# Patient Record
Sex: MALE | Race: WHITE | NOT HISPANIC OR LATINO | Employment: OTHER | URBAN - NONMETROPOLITAN AREA
[De-identification: names, ages, dates, MRNs, and addresses within clinical notes are randomized per-mention and may not be internally consistent; named-entity substitution may affect disease eponyms.]

---

## 2017-02-05 ENCOUNTER — APPOINTMENT (OUTPATIENT)
Dept: GENERAL RADIOLOGY | Facility: HOSPITAL | Age: 27
End: 2017-02-05

## 2017-02-05 ENCOUNTER — HOSPITAL ENCOUNTER (OUTPATIENT)
Facility: HOSPITAL | Age: 27
Setting detail: OBSERVATION
Discharge: HOME | End: 2017-02-06
Attending: EMERGENCY MEDICINE | Admitting: PSYCHIATRY & NEUROLOGY

## 2017-02-05 ENCOUNTER — APPOINTMENT (OUTPATIENT)
Dept: CT IMAGING | Facility: HOSPITAL | Age: 27
End: 2017-02-05

## 2017-02-05 DIAGNOSIS — G25.3 MYOCLONUS: ICD-10-CM

## 2017-02-05 DIAGNOSIS — Z98.2 VP (VENTRICULOPERITONEAL) SHUNT STATUS: Primary | ICD-10-CM

## 2017-02-05 LAB
ALBUMIN SERPL-MCNC: 4.2 G/DL (ref 3.5–5)
ALBUMIN/GLOB SERPL: 1.5 G/DL (ref 1.1–2.5)
ALP SERPL-CCNC: 41 U/L (ref 24–120)
ALT SERPL W P-5'-P-CCNC: 41 U/L (ref 0–54)
ANION GAP SERPL CALCULATED.3IONS-SCNC: 10 MMOL/L (ref 4–13)
ANION GAP SERPL CALCULATED.3IONS-SCNC: 9 MMOL/L (ref 4–13)
AST SERPL-CCNC: 21 U/L (ref 7–45)
BASOPHILS # BLD AUTO: 0.02 10*3/MM3 (ref 0–0.2)
BASOPHILS # BLD AUTO: 0.02 10*3/MM3 (ref 0–0.2)
BASOPHILS NFR BLD AUTO: 0.2 % (ref 0–2)
BASOPHILS NFR BLD AUTO: 0.3 % (ref 0–2)
BILIRUB SERPL-MCNC: 0.5 MG/DL (ref 0.1–1)
BUN BLD-MCNC: 13 MG/DL (ref 5–21)
BUN BLD-MCNC: 14 MG/DL (ref 5–21)
BUN/CREAT SERPL: 19.4 (ref 7–25)
BUN/CREAT SERPL: 20.3 (ref 7–25)
CALCIUM SPEC-SCNC: 9 MG/DL (ref 8.4–10.4)
CALCIUM SPEC-SCNC: 9 MG/DL (ref 8.4–10.4)
CHLORIDE SERPL-SCNC: 104 MMOL/L (ref 98–110)
CHLORIDE SERPL-SCNC: 106 MMOL/L (ref 98–110)
CO2 SERPL-SCNC: 25 MMOL/L (ref 24–31)
CO2 SERPL-SCNC: 27 MMOL/L (ref 24–31)
CREAT BLD-MCNC: 0.67 MG/DL (ref 0.5–1.4)
CREAT BLD-MCNC: 0.69 MG/DL (ref 0.5–1.4)
DEPRECATED RDW RBC AUTO: 41.4 FL (ref 40–54)
DEPRECATED RDW RBC AUTO: 41.5 FL (ref 40–54)
EOSINOPHIL # BLD AUTO: 0.08 10*3/MM3 (ref 0–0.7)
EOSINOPHIL # BLD AUTO: 0.08 10*3/MM3 (ref 0–0.7)
EOSINOPHIL NFR BLD AUTO: 1 % (ref 0–4)
EOSINOPHIL NFR BLD AUTO: 1.1 % (ref 0–4)
ERYTHROCYTE [DISTWIDTH] IN BLOOD BY AUTOMATED COUNT: 13 % (ref 12–15)
ERYTHROCYTE [DISTWIDTH] IN BLOOD BY AUTOMATED COUNT: 13 % (ref 12–15)
GFR SERPL CREATININE-BSD FRML MDRD: 141 ML/MIN/1.73
GFR SERPL CREATININE-BSD FRML MDRD: 146 ML/MIN/1.73
GLOBULIN UR ELPH-MCNC: 2.8 GM/DL
GLUCOSE BLD-MCNC: 93 MG/DL (ref 70–100)
GLUCOSE BLD-MCNC: 95 MG/DL (ref 70–100)
HCT VFR BLD AUTO: 40.3 % (ref 40–52)
HCT VFR BLD AUTO: 40.9 % (ref 40–52)
HGB BLD-MCNC: 14.1 G/DL (ref 14–18)
HGB BLD-MCNC: 14.3 G/DL (ref 14–18)
IMM GRANULOCYTES # BLD: 0.02 10*3/MM3 (ref 0–0.03)
IMM GRANULOCYTES # BLD: 0.02 10*3/MM3 (ref 0–0.03)
IMM GRANULOCYTES NFR BLD: 0.2 % (ref 0–5)
IMM GRANULOCYTES NFR BLD: 0.3 % (ref 0–5)
LYMPHOCYTES # BLD AUTO: 1.78 10*3/MM3 (ref 0.72–4.86)
LYMPHOCYTES # BLD AUTO: 2.07 10*3/MM3 (ref 0.72–4.86)
LYMPHOCYTES NFR BLD AUTO: 23.7 % (ref 15–45)
LYMPHOCYTES NFR BLD AUTO: 25 % (ref 15–45)
MCH RBC QN AUTO: 30.4 PG (ref 28–32)
MCH RBC QN AUTO: 30.5 PG (ref 28–32)
MCHC RBC AUTO-ENTMCNC: 35 G/DL (ref 33–36)
MCHC RBC AUTO-ENTMCNC: 35 G/DL (ref 33–36)
MCV RBC AUTO: 86.8 FL (ref 82–95)
MCV RBC AUTO: 87 FL (ref 82–95)
MONOCYTES # BLD AUTO: 0.56 10*3/MM3 (ref 0.19–1.3)
MONOCYTES # BLD AUTO: 0.65 10*3/MM3 (ref 0.19–1.3)
MONOCYTES NFR BLD AUTO: 7.5 % (ref 4–12)
MONOCYTES NFR BLD AUTO: 7.9 % (ref 4–12)
NEUTROPHILS # BLD AUTO: 5.05 10*3/MM3 (ref 1.87–8.4)
NEUTROPHILS # BLD AUTO: 5.43 10*3/MM3 (ref 1.87–8.4)
NEUTROPHILS NFR BLD AUTO: 65.7 % (ref 39–78)
NEUTROPHILS NFR BLD AUTO: 67.1 % (ref 39–78)
PLATELET # BLD AUTO: 232 10*3/MM3 (ref 130–400)
PLATELET # BLD AUTO: 239 10*3/MM3 (ref 130–400)
PMV BLD AUTO: 9.5 FL (ref 6–12)
PMV BLD AUTO: 9.7 FL (ref 6–12)
POTASSIUM BLD-SCNC: 3.9 MMOL/L (ref 3.5–5.3)
POTASSIUM BLD-SCNC: 4.3 MMOL/L (ref 3.5–5.3)
PROT SERPL-MCNC: 7 G/DL (ref 6.3–8.7)
RBC # BLD AUTO: 4.63 10*6/MM3 (ref 4.8–5.9)
RBC # BLD AUTO: 4.71 10*6/MM3 (ref 4.8–5.9)
SODIUM BLD-SCNC: 140 MMOL/L (ref 135–145)
SODIUM BLD-SCNC: 141 MMOL/L (ref 135–145)
WBC NRBC COR # BLD: 7.51 10*3/MM3 (ref 4.8–10.8)
WBC NRBC COR # BLD: 8.27 10*3/MM3 (ref 4.8–10.8)

## 2017-02-05 PROCEDURE — 96374 THER/PROPH/DIAG INJ IV PUSH: CPT

## 2017-02-05 PROCEDURE — 85025 COMPLETE CBC W/AUTO DIFF WBC: CPT | Performed by: EMERGENCY MEDICINE

## 2017-02-05 PROCEDURE — 80053 COMPREHEN METABOLIC PANEL: CPT | Performed by: EMERGENCY MEDICINE

## 2017-02-05 PROCEDURE — 70360 X-RAY EXAM OF NECK: CPT

## 2017-02-05 PROCEDURE — 93010 ELECTROCARDIOGRAM REPORT: CPT | Performed by: INTERNAL MEDICINE

## 2017-02-05 PROCEDURE — 71010 HC CHEST PA OR AP: CPT

## 2017-02-05 PROCEDURE — G0378 HOSPITAL OBSERVATION PER HR: HCPCS

## 2017-02-05 PROCEDURE — 94760 N-INVAS EAR/PLS OXIMETRY 1: CPT

## 2017-02-05 PROCEDURE — 94640 AIRWAY INHALATION TREATMENT: CPT

## 2017-02-05 PROCEDURE — 96372 THER/PROPH/DIAG INJ SC/IM: CPT

## 2017-02-05 PROCEDURE — 99284 EMERGENCY DEPT VISIT MOD MDM: CPT

## 2017-02-05 PROCEDURE — 99220 PR INITIAL OBSERVATION CARE/DAY 70 MINUTES: CPT | Performed by: PSYCHIATRY & NEUROLOGY

## 2017-02-05 PROCEDURE — 85025 COMPLETE CBC W/AUTO DIFF WBC: CPT | Performed by: PSYCHIATRY & NEUROLOGY

## 2017-02-05 PROCEDURE — 94799 UNLISTED PULMONARY SVC/PX: CPT

## 2017-02-05 PROCEDURE — 25010000002 ENOXAPARIN PER 10 MG: Performed by: PSYCHIATRY & NEUROLOGY

## 2017-02-05 PROCEDURE — 74000 HC ABDOMEN KUB: CPT

## 2017-02-05 PROCEDURE — 70450 CT HEAD/BRAIN W/O DYE: CPT

## 2017-02-05 PROCEDURE — 93005 ELECTROCARDIOGRAM TRACING: CPT | Performed by: PSYCHIATRY & NEUROLOGY

## 2017-02-05 RX ORDER — ACETAMINOPHEN 325 MG/1
650 TABLET ORAL EVERY 4 HOURS PRN
Status: DISCONTINUED | OUTPATIENT
Start: 2017-02-05 | End: 2017-02-06 | Stop reason: HOSPADM

## 2017-02-05 RX ORDER — PANTOPRAZOLE SODIUM 40 MG/10ML
40 INJECTION, POWDER, LYOPHILIZED, FOR SOLUTION INTRAVENOUS
Status: DISCONTINUED | OUTPATIENT
Start: 2017-02-05 | End: 2017-02-05 | Stop reason: CLARIF

## 2017-02-05 RX ORDER — SODIUM CHLORIDE 0.9 % (FLUSH) 0.9 %
1-10 SYRINGE (ML) INJECTION AS NEEDED
Status: DISCONTINUED | OUTPATIENT
Start: 2017-02-05 | End: 2017-02-06 | Stop reason: HOSPADM

## 2017-02-05 RX ORDER — ZOLPIDEM TARTRATE 5 MG/1
5 TABLET ORAL NIGHTLY PRN
Status: DISCONTINUED | OUTPATIENT
Start: 2017-02-05 | End: 2017-02-06 | Stop reason: HOSPADM

## 2017-02-05 RX ORDER — FAMOTIDINE 20 MG/1
20 TABLET, FILM COATED ORAL DAILY
Status: DISCONTINUED | OUTPATIENT
Start: 2017-02-05 | End: 2017-02-06 | Stop reason: HOSPADM

## 2017-02-05 RX ORDER — ALBUTEROL SULFATE 2.5 MG/3ML
2.5 SOLUTION RESPIRATORY (INHALATION)
Status: DISCONTINUED | OUTPATIENT
Start: 2017-02-05 | End: 2017-02-06 | Stop reason: HOSPADM

## 2017-02-05 RX ORDER — PANTOPRAZOLE SODIUM 40 MG/1
40 TABLET, DELAYED RELEASE ORAL
Status: DISCONTINUED | OUTPATIENT
Start: 2017-02-06 | End: 2017-02-06 | Stop reason: HOSPADM

## 2017-02-05 RX ORDER — RANITIDINE HCL 75 MG
75 TABLET ORAL AS NEEDED
COMMUNITY

## 2017-02-05 RX ADMIN — PANTOPRAZOLE SODIUM 40 MG: 40 INJECTION, POWDER, FOR SOLUTION INTRAVENOUS at 08:54

## 2017-02-05 RX ADMIN — ENOXAPARIN SODIUM 40 MG: 40 INJECTION SUBCUTANEOUS at 15:46

## 2017-02-05 RX ADMIN — ALBUTEROL SULFATE 2.5 MG: 2.5 SOLUTION RESPIRATORY (INHALATION) at 21:26

## 2017-02-05 RX ADMIN — FAMOTIDINE 20 MG: 20 TABLET, FILM COATED ORAL at 15:46

## 2017-02-05 NOTE — ED PROVIDER NOTES
Subjective   HPI Comments: She was seen at [facility]  x-rays were done and it would not read by the radiologist and the patient was transferred over here  History intracranial shunt and had a head-bobbing when the last time he has as head bobbing and his shunt was malfunctioning    Patient is a 26 y.o. male presenting with seizures.   Seizures   Seizure activity on arrival: no    Seizure type:  Focal  Preceding symptoms: no sensation of an aura present, no dizziness, no euphoria, no headache, no hyperventilation, no nausea, no numbness, no panic and no vision change    Initial focality:  Facial  Episode characteristics: abnormal movements    Episode characteristics: no apnea, no combativeness, no confusion, no disorientation, no focal shaking, no generalized shaking, no incontinence, no limpness, no stiffening and responsive    Postictal symptoms: no confusion    Return to baseline: yes    Severity:  Mild  Timing:  Once  Progression:  Resolved  Context: intracranial shunt    Context: not alcohol withdrawal, not cerebral palsy, not change in medication, not sleeping less, not developmental delay, not drug use, not emotional upset, not family hx of seizures, not hydrocephalus, not intracranial lesion, medical compliance, not possible medication ingestion and not stress    Recent head injury:  No recent head injuries  PTA treatment:  Lorazepam  History of seizures: yes    Severity:  Mild  Seizure control level:  Well controlled      Review of Systems   Constitutional: Negative.  Negative for diaphoresis, fatigue and fever.   HENT: Negative.  Negative for congestion, drooling, ear pain, facial swelling, hearing loss, tinnitus, trouble swallowing and voice change.    Eyes: Negative.  Negative for photophobia and visual disturbance.   Respiratory: Negative.  Negative for apnea, choking, shortness of breath and stridor.    Cardiovascular: Negative.  Negative for chest pain and palpitations.   Gastrointestinal: Negative.   Negative for abdominal pain, nausea and vomiting.   Endocrine: Negative.  Negative for cold intolerance, heat intolerance and polyuria.   Genitourinary: Negative.  Negative for enuresis.   Musculoskeletal: Negative.  Negative for arthralgias, back pain, gait problem, neck pain and neck stiffness.   Skin: Negative.  Negative for color change, pallor and rash.   Allergic/Immunologic: Negative.    Neurological: Positive for seizures. Negative for dizziness, syncope, facial asymmetry, speech difficulty, weakness, light-headedness, numbness and headaches.   Hematological: Negative.    Psychiatric/Behavioral: Negative for agitation, confusion and decreased concentration. The patient is not nervous/anxious.    All other systems reviewed and are negative.      Past Medical History   Diagnosis Date   • Spina bifida        Allergies   Allergen Reactions   • Oxycodone Anaphylaxis   • Latex Itching       Past Surgical History   Procedure Laterality Date   • Shunt revision     •  shunt insertion         History reviewed. No pertinent family history.    Social History     Social History   • Marital status: Single     Spouse name: N/A   • Number of children: N/A   • Years of education: N/A     Social History Main Topics   • Smoking status: Never Smoker   • Smokeless tobacco: None   • Alcohol use No   • Drug use: No   • Sexual activity: Not Asked     Other Topics Concern   • None     Social History Narrative   • None           Objective   Physical Exam   Constitutional: He appears well-developed and well-nourished. No distress.   HENT:   Head: Normocephalic.   Nose: Nose normal.   Mouth/Throat: Oropharynx is clear and moist.   Eyes: EOM are normal. Pupils are equal, round, and reactive to light. Right eye exhibits no discharge. Left eye exhibits no discharge. No scleral icterus.   Neck: Normal range of motion. Neck supple. No JVD present. No rigidity. No tracheal deviation present. No Brudzinski's sign and no Kernig's sign noted.  No thyromegaly present.   Cardiovascular: Normal rate, regular rhythm and normal heart sounds.    Pulmonary/Chest: Effort normal and breath sounds normal. No stridor. No respiratory distress. He has no wheezes. He has no rales. He exhibits no tenderness.   Abdominal: Soft. Bowel sounds are normal. He exhibits no distension and no mass.   Musculoskeletal: Normal range of motion. He exhibits no edema or tenderness.   Neurological: He is alert. He has normal reflexes. He displays normal reflexes. No cranial nerve deficit or sensory deficit. He exhibits normal muscle tone. He displays no seizure activity. Coordination normal.   Skin: Skin is warm and dry. No rash noted. He is not diaphoretic. No erythema.   Psychiatric: He has a normal mood and affect.   Nursing note and vitals reviewed.      Procedures         ED Course  ED Course   Comment By Time   Dr Story seeing pt Shimon Scott MD 02/05 6857                  [facility]    Final diagnoses:    (ventriculoperitoneal) shunt status   Myoclonus            Sihmon Scott MD  02/05/17 3507

## 2017-02-05 NOTE — ED NOTES
PTA pt received a total of 3mg of Ativan IV, 1mg morphine IV and 1000mg Depakote IV     Ananya Wallis RN  02/05/17 0620

## 2017-02-05 NOTE — PLAN OF CARE
Problem: Patient Care Overview (Adult)  Goal: Plan of Care Review  Outcome: Ongoing (interventions implemented as appropriate)    02/05/17 9662   Coping/Psychosocial Response Interventions   Plan Of Care Reviewed With patient   Patient Care Overview   Progress no change   Outcome Evaluation   Outcome Summary/Follow up Plan Patient has decreased tremors and has bed check on.         Problem: Fall Risk (Adult)  Goal: Identify Related Risk Factors and Signs and Symptoms  Outcome: Ongoing (interventions implemented as appropriate)  Goal: Absence of Falls  Outcome: Ongoing (interventions implemented as appropriate)

## 2017-02-05 NOTE — H&P
Neurology History & Physical    Indication for Admission: myoclonus    History of present illness:  This is a 26-year-old  male with a history of absence of corpus callosum in addition to lumbar spina bifida.  He received a ventriculoperitoneal shunt at birth and has been revised 3 times per the patient.  The last time it was revised was approximately 3 years ago.  He believes that it was [facility].  In the past the signs that have led to discovery of shunt failure have included myoclonic jerking.  Over the course of last night he began having full body myoclonic jerks that happen several times per minute.  He had full retention of consciousness.  He had no tongue biting and no incontinence.  He remains awake through the events.  He denies any fevers chills neck stiffness or photophobia.  He denies headache or vision changes.  He does complain of esophageal reflux which is a chronic issue for him.  I received to transfer request from an outside hospital.  At the outside hospital a CT was unremarkable but they could not perform a shunt series.  Given the fact that I was concerned about the frequency of his myoclonus I did ask that they load him with 1000 mg of Depakote.    Past Medical History   Diagnosis Date   • Spina bifida     status post ventriculoperitoneal shunt with revisions    Allergies   Allergen Reactions   • Oxycodone Anaphylaxis   • Latex Itching      Meds:  rinantadine 75 mg twice a day    (Not in a hospital admission)    Social History     Social History   • Marital status: Single     Spouse name: N/A   • Number of children: N/A   • Years of education: N/A     Occupational History   • Not on file.     Social History Main Topics   • Smoking status: Never Smoker   • Smokeless tobacco: Not on file   • Alcohol use No   • Drug use: No   • Sexual activity: Not on file     Other Topics Concern   • Not on file     Social History Narrative   • No narrative on file     History  reviewed. No pertinent family history.    Review of Systems  GERD  All other 10 systems reviewed and neg    Vital Signs   Temp:  [97.2 °F (36.2 °C)] 97.2 °F (36.2 °C)  Heart Rate:  [] 102  Resp:  [20] 20  BP: (116-134)/(78-90) 116/78    General Exam:  Head:  Normal cephalic, atraumatic.  Shunt reservoir located in a left posterior a radicular region.  It is compressible.  HEENT:  Neck supple.  The patient has an anterior left neck linear scar consistent with his shunt.  It is not erythematous.  There is no signs of warmth over it.  There is no pitting edema around it.  Fundoscopic Exam:  No signs of disc edema  CVS:  Regular rate and rhythm.  No murmurs  Carotid Examination:  No bruits  Lungs:  Clear to auscultation  Abdomen:  Non-tender, Non-distended  Extremities:  He has bilateral foot drop.  His lower extremities are wasted his left one via being affected more than the right.  Skin:  No rashes  Chronic large scar on low back consistent with spina bifida surgery.    Neurologic Exam:    Mental Status:    -Awake, Alert, Oriented X 3  -No word finding difficulties  -No aphasia  -No dysarthria  -Follows simple and complex commands    CN II:  Visual fields full.  Pupils equally reactive to light  CN III, IV, VI:  Extraocular Muscles full with no signs of nystagmus  CN V:  Facial sensory is symmetric with no asymetries.  CN VII:  Facial motor symmetric  CN VIII:  Gross hearing intact bilaterally  CN IX:  Palate elevates symmetrically  CN X:  Palate elevates symmetrically  CN XI:  Shoulder shrug symmetric  CN XII:  Tongue is midline on protrusion    Motor:     Bilateral upper extremities have full strength  Bilateral lower extremities have 3 out of 5 strength proximally with distal strength being approximately 2 out of 5.  There is chronic atrophy and some minimal contractions.  The patient does wear bilateral leg braces.    DTR:  Hyperreflexia in the lower extremities    Sensory:  -Intact to light touch,  pinprick, temperature, pain, and proprioception    Coordination:  No signs of myoclonus or tremors on my examination.  The patient has been loaded with Depakote at the outside [facility].    Gait  Attempted.  Patient requires leg braces.      Results Review:  Lab Results (last 7 days)     Procedure Component Value Units Date/Time    CBC & Differential [[id number]] Collected:  02/05/17 0834    Specimen:  Blood Updated:  02/05/17 0856    Narrative:       The following orders were created for panel order CBC & Differential.  Procedure                               Abnormality         Status                     ---------                               -----------         ------                     CBC Auto Differential[[id number]]         Abnormal            Final result                 Please view results for these tests on the individual orders.    CBC Auto Differential [[id number]]  (Abnormal) Collected:  02/05/17 0834    Specimen:  Blood from Arm, Left Updated:  02/05/17 0856     WBC 8.27 10*3/mm3      RBC 4.71 (L) 10*6/mm3      Hemoglobin 14.3 g/dL      Hematocrit 40.9 %      MCV 86.8 fL      MCH 30.4 pg      MCHC 35.0 g/dL      RDW 13.0 %      RDW-SD 41.5 fl      MPV 9.7 fL      Platelets 239 10*3/mm3      Neutrophil % 65.7 %      Lymphocyte % 25.0 %      Monocyte % 7.9 %      Eosinophil % 1.0 %      Basophil % 0.2 %      Immature Grans % 0.2 %      Neutrophils, Absolute 5.43 10*3/mm3      Lymphocytes, Absolute 2.07 10*3/mm3      Monocytes, Absolute 0.65 10*3/mm3      Eosinophils, Absolute 0.08 10*3/mm3      Basophils, Absolute 0.02 10*3/mm3      Immature Grans, Absolute 0.02 10*3/mm3     Comprehensive Metabolic Panel [[id number]] Collected:  02/05/17 0833    Specimen:  Blood from Arm, Left Updated:  02/05/17 0857     Glucose 95 mg/dL      BUN 13 mg/dL      Creatinine 0.67 mg/dL      Sodium 141 mmol/L      Potassium 3.9 mmol/L      Chloride 104 mmol/L      CO2 27.0 mmol/L      Calcium 9.0 mg/dL      Total Protein 7.0  g/dL      Albumin 4.20 g/dL      ALT (SGPT) 41 U/L      AST (SGOT) 21 U/L      Alkaline Phosphatase 41 U/L      Total Bilirubin 0.5 mg/dL      eGFR Non African Amer 146 mL/min/1.73      Globulin 2.8 gm/dL      A/G Ratio 1.5 g/dL      BUN/Creatinine Ratio 19.4      Anion Gap 10.0 mmol/L         Patient Active Problem List   Diagnosis   • Myoclonus     CT of head without contrast reviewed by me.  There is an absence of the corpus callosum.  The shunt appears to terminate in the left lateral ventricle.  There are no obvious signs of hydrocephalus.    A shunt series is reviewed by me as well.  The shunt appears to be continuous from the ventricle all way down to the abdomen.  There is a fibrosis section the neck approximately 10 centimeters long.  This is consistent with the physical exam scar listed above.      Impression:    1.  Acute episode of myoclonus - rule out shunt failure as cause  2.  Spina bifida    Plan:    1.  EEG  2.  Neurosurgery consult to assess shunt functionality  3.  Will admit to observation overnight   4.  Continue home GERD medicine    Kun Camacho MD  02/05/17  9:38 AM

## 2017-02-06 ENCOUNTER — APPOINTMENT (OUTPATIENT)
Dept: NEUROLOGY | Facility: HOSPITAL | Age: 27
End: 2017-02-06
Attending: PSYCHIATRY & NEUROLOGY

## 2017-02-06 VITALS
TEMPERATURE: 97.9 F | RESPIRATION RATE: 18 BRPM | DIASTOLIC BLOOD PRESSURE: 63 MMHG | HEART RATE: 129 BPM | BODY MASS INDEX: 27.88 KG/M2 | WEIGHT: 142 LBS | SYSTOLIC BLOOD PRESSURE: 123 MMHG | OXYGEN SATURATION: 96 % | HEIGHT: 60 IN

## 2017-02-06 LAB
ANION GAP SERPL CALCULATED.3IONS-SCNC: 10 MMOL/L (ref 4–13)
BASOPHILS # BLD AUTO: 0.03 10*3/MM3 (ref 0–0.2)
BASOPHILS NFR BLD AUTO: 0.5 % (ref 0–2)
BUN BLD-MCNC: 13 MG/DL (ref 5–21)
BUN/CREAT SERPL: 18.3 (ref 7–25)
CALCIUM SPEC-SCNC: 8.9 MG/DL (ref 8.4–10.4)
CHLORIDE SERPL-SCNC: 104 MMOL/L (ref 98–110)
CO2 SERPL-SCNC: 25 MMOL/L (ref 24–31)
CREAT BLD-MCNC: 0.71 MG/DL (ref 0.5–1.4)
DEPRECATED RDW RBC AUTO: 42.2 FL (ref 40–54)
EOSINOPHIL # BLD AUTO: 0.27 10*3/MM3 (ref 0–0.7)
EOSINOPHIL NFR BLD AUTO: 4.4 % (ref 0–4)
ERYTHROCYTE [DISTWIDTH] IN BLOOD BY AUTOMATED COUNT: 13.2 % (ref 12–15)
GFR SERPL CREATININE-BSD FRML MDRD: 136 ML/MIN/1.73
GLUCOSE BLD-MCNC: 103 MG/DL (ref 70–100)
HCT VFR BLD AUTO: 39.6 % (ref 40–52)
HGB BLD-MCNC: 13.4 G/DL (ref 14–18)
IMM GRANULOCYTES # BLD: 0.01 10*3/MM3 (ref 0–0.03)
IMM GRANULOCYTES NFR BLD: 0.2 % (ref 0–5)
LYMPHOCYTES # BLD AUTO: 2.54 10*3/MM3 (ref 0.72–4.86)
LYMPHOCYTES NFR BLD AUTO: 41.4 % (ref 15–45)
MCH RBC QN AUTO: 29.8 PG (ref 28–32)
MCHC RBC AUTO-ENTMCNC: 33.8 G/DL (ref 33–36)
MCV RBC AUTO: 88 FL (ref 82–95)
MONOCYTES # BLD AUTO: 0.35 10*3/MM3 (ref 0.19–1.3)
MONOCYTES NFR BLD AUTO: 5.7 % (ref 4–12)
NEUTROPHILS # BLD AUTO: 2.93 10*3/MM3 (ref 1.87–8.4)
NEUTROPHILS NFR BLD AUTO: 47.8 % (ref 39–78)
PLATELET # BLD AUTO: 211 10*3/MM3 (ref 130–400)
PMV BLD AUTO: 9.9 FL (ref 6–12)
POTASSIUM BLD-SCNC: 3.4 MMOL/L (ref 3.5–5.3)
RBC # BLD AUTO: 4.5 10*6/MM3 (ref 4.8–5.9)
SODIUM BLD-SCNC: 139 MMOL/L (ref 135–145)
WBC NRBC COR # BLD: 6.13 10*3/MM3 (ref 4.8–10.8)

## 2017-02-06 PROCEDURE — 94799 UNLISTED PULMONARY SVC/PX: CPT

## 2017-02-06 PROCEDURE — 95816 EEG AWAKE AND DROWSY: CPT

## 2017-02-06 PROCEDURE — 85025 COMPLETE CBC W/AUTO DIFF WBC: CPT | Performed by: PSYCHIATRY & NEUROLOGY

## 2017-02-06 PROCEDURE — 95816 EEG AWAKE AND DROWSY: CPT | Performed by: PSYCHIATRY & NEUROLOGY

## 2017-02-06 PROCEDURE — 25010000002 ENOXAPARIN PER 10 MG: Performed by: PSYCHIATRY & NEUROLOGY

## 2017-02-06 PROCEDURE — 96372 THER/PROPH/DIAG INJ SC/IM: CPT

## 2017-02-06 PROCEDURE — 99219 PR INITIAL OBSERVATION CARE/DAY 50 MINUTES: CPT | Performed by: NEUROLOGICAL SURGERY

## 2017-02-06 PROCEDURE — 94640 AIRWAY INHALATION TREATMENT: CPT

## 2017-02-06 PROCEDURE — 80048 BASIC METABOLIC PNL TOTAL CA: CPT | Performed by: PSYCHIATRY & NEUROLOGY

## 2017-02-06 PROCEDURE — 94760 N-INVAS EAR/PLS OXIMETRY 1: CPT

## 2017-02-06 PROCEDURE — 99214 OFFICE O/P EST MOD 30 MIN: CPT | Performed by: PSYCHIATRY & NEUROLOGY

## 2017-02-06 PROCEDURE — G0378 HOSPITAL OBSERVATION PER HR: HCPCS

## 2017-02-06 RX ORDER — DIVALPROEX SODIUM 500 MG/1
1000 TABLET, DELAYED RELEASE ORAL EVERY 8 HOURS SCHEDULED
Status: DISCONTINUED | OUTPATIENT
Start: 2017-02-06 | End: 2017-02-06

## 2017-02-06 RX ORDER — DIVALPROEX SODIUM 500 MG/1
1000 TABLET, DELAYED RELEASE ORAL EVERY 8 HOURS SCHEDULED
Status: DISCONTINUED | OUTPATIENT
Start: 2017-02-06 | End: 2017-02-06 | Stop reason: HOSPADM

## 2017-02-06 RX ORDER — DIVALPROEX SODIUM 250 MG/1
500 TABLET, DELAYED RELEASE ORAL 2 TIMES DAILY
Qty: 90 TABLET | Refills: 2 | Status: SHIPPED | OUTPATIENT
Start: 2017-02-06 | End: 2017-02-28 | Stop reason: SDUPTHER

## 2017-02-06 RX ADMIN — ALBUTEROL SULFATE 2.5 MG: 2.5 SOLUTION RESPIRATORY (INHALATION) at 03:47

## 2017-02-06 RX ADMIN — ALBUTEROL SULFATE 2.5 MG: 2.5 SOLUTION RESPIRATORY (INHALATION) at 15:10

## 2017-02-06 RX ADMIN — DIVALPROEX SODIUM 1000 MG: 500 TABLET, DELAYED RELEASE ORAL at 11:02

## 2017-02-06 RX ADMIN — ALBUTEROL SULFATE 2.5 MG: 2.5 SOLUTION RESPIRATORY (INHALATION) at 10:35

## 2017-02-06 RX ADMIN — PANTOPRAZOLE SODIUM 40 MG: 40 TABLET, DELAYED RELEASE ORAL at 05:16

## 2017-02-06 RX ADMIN — ALBUTEROL SULFATE 2.5 MG: 2.5 SOLUTION RESPIRATORY (INHALATION) at 07:01

## 2017-02-06 RX ADMIN — ACETAMINOPHEN 650 MG: 325 TABLET ORAL at 05:16

## 2017-02-06 RX ADMIN — ACETAMINOPHEN 650 MG: 325 TABLET ORAL at 19:49

## 2017-02-06 RX ADMIN — ENOXAPARIN SODIUM 40 MG: 40 INJECTION SUBCUTANEOUS at 11:01

## 2017-02-06 NOTE — DISCHARGE SUMMARY
Date of Admission: 2/5/2017  Date of Discharge:  2/6/2017    Admitting Diagnosis: Myoclonus  Discharge Diagnosis:   1.  Myoclonus  2.  V-P shunt functioning properly.  3.  GERD    Procedures Performed  CT head  XR shunt series  EEG       Consults:   Consults     Date and Time Order Name Status Description    2/5/2017 1123 Inpatient Consult to Neurosurgery Completed     2/5/2017 0918 Neurology (on-call MD unless specified)            Presenting Problem/History of Present Illness  Myoclonus [G25.3]   (ventriculoperitoneal) shunt status [Z98.2]     Pertinent Test Results:    Imaging Results (last 72 hours)     Procedure Component Value Units Date/Time    CT Head Without Contrast [[id number]] Collected:  02/05/17 0812     Updated:  02/05/17 0818    Narrative:       CT BRAIN without contrast dated 2/5/2017 8:00 AM CST     HISTORY: Shunt malfunction     COMPARISON: None      DOSE LENGTH PRODUCT: 645 mGy cm     In order to have a CT radiation dose as low as reasonably achievable,  Automated Exposure Control was utilized for adjustment of the mA and/or  KV according to patient size.     TECHNIQUE: Serial axial tomographic images of the brain were obtained  without the use of intravenous contrast.      FINDINGS:   LEFT posterior parietal approach ventriculostomy shunt catheter is seen  terminating in the region of the body of the LEFT lateral ventricle.  There is no hydrocephalus. No abnormal extra-axial blood products are  identified. Surrounding soft tissue structures demonstrate no acute  findings either. Findings are suggestive of absence of the corpus  callosum.       Impression:       1. 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 There is no evidence of hydrocephalus. LEFT posterior parietal  approach ventriculostomy shunt catheter appears to terminate in the body  of the LEFT lateral ventricle.  This report was finalized on 02/05/2017 08:15 by Dr. Kun David MD.    XR Chest 1  View [[id number]] Collected:  02/05/17 0835     Updated:  02/05/17 0849    Narrative:       EXAMINATION: XR NECK SOFT TISSUE-, XR ABDOMEN KUB-, XR CHEST 1 VW-   2/5/2017 8:26 AM CST     COMPARISON: None     HISTORY: Shunt series, shunt malfunction     FINDINGS:  A single lateral view of the neck, a frontal view of the chest, and a  frontal view of the abdomen was provided.     The course of the shunt over the posterior aspect of the neck is  uninterrupted. The shunt catheter projects over the LEFT side of the  chest and over the upper chest the course of the shunt catheter appears  to be significantly encrusted over a course of at least 10.5 cm. The  shunt catheter is poorly visualized over the LEFT pulmonary artery but  is felt to be intact. Only a portion of the shunt is seen in the upper  abdomen. The upper portion of the abdomen is not included on this  examination. I suspect that the catheter may terminate in the upper  abdomen on the chest radiograph; however, this area is poorly evaluated.  Chronic changes are noted in the bones of the pelvis and lower lumbar  spine. There is constipation.       Impression:       1. The shunt catheter is encrusted as it courses over the upper chest.  The catheter appears to be grossly intact.  2. The shunt catheter is not completely visualized in the abdomen.   3. Constipation.   4. Chronic changes in the osseous structures of the pelvis and lumbar  spine.  This report was finalized on 02/05/2017 08:47 by Dr. Kun David MD.    XR Neck Soft Tissue [[id number]] Collected:  02/05/17 0835     Updated:  02/05/17 0849    Narrative:       EXAMINATION: XR NECK SOFT TISSUE-, XR ABDOMEN KUB-, XR CHEST 1 VW-   2/5/2017 8:26 AM CST     COMPARISON: None     HISTORY: Shunt series, shunt malfunction     FINDINGS:  A single lateral view of the neck, a frontal view of the chest, and a  frontal view of the abdomen was provided.     The course of the shunt over the posterior aspect of the neck  is  uninterrupted. The shunt catheter projects over the LEFT side of the  chest and over the upper chest the course of the shunt catheter appears  to be significantly encrusted over a course of at least 10.5 cm. The  shunt catheter is poorly visualized over the LEFT pulmonary artery but  is felt to be intact. Only a portion of the shunt is seen in the upper  abdomen. The upper portion of the abdomen is not included on this  examination. I suspect that the catheter may terminate in the upper  abdomen on the chest radiograph; however, this area is poorly evaluated.  Chronic changes are noted in the bones of the pelvis and lower lumbar  spine. There is constipation.       Impression:       1. The shunt catheter is encrusted as it courses over the upper chest.  The catheter appears to be grossly intact.  2. The shunt catheter is not completely visualized in the abdomen.   3. Constipation.   4. Chronic changes in the osseous structures of the pelvis and lumbar  spine.  This report was finalized on 02/05/2017 08:47 by Dr. Kun David MD.    XR Abdomen KUB [[id number]] Collected:  02/05/17 0835     Updated:  02/05/17 0849    Narrative:       EXAMINATION: XR NECK SOFT TISSUE-, XR ABDOMEN KUB-, XR CHEST 1 VW-   2/5/2017 8:26 AM CST     COMPARISON: None     HISTORY: Shunt series, shunt malfunction     FINDINGS:  A single lateral view of the neck, a frontal view of the chest, and a  frontal view of the abdomen was provided.     The course of the shunt over the posterior aspect of the neck is  uninterrupted. The shunt catheter projects over the LEFT side of the  chest and over the upper chest the course of the shunt catheter appears  to be significantly encrusted over a course of at least 10.5 cm. The  shunt catheter is poorly visualized over the LEFT pulmonary artery but  is felt to be intact. Only a portion of the shunt is seen in the upper  abdomen. The upper portion of the abdomen is not included on this  examination. I  suspect that the catheter may terminate in the upper  abdomen on the chest radiograph; however, this area is poorly evaluated.  Chronic changes are noted in the bones of the pelvis and lower lumbar  spine. There is constipation.       Impression:       1. The shunt catheter is encrusted as it courses over the upper chest.  The catheter appears to be grossly intact.  2. The shunt catheter is not completely visualized in the abdomen.   3. Constipation.   4. Chronic changes in the osseous structures of the pelvis and lumbar  spine.  This report was finalized on 02/05/2017 08:47 by Dr. Kun David MD.        Lab Results (last 72 hours)     Procedure Component Value Units Date/Time    CBC & Differential [[id number]] Collected:  02/05/17 0834    Specimen:  Blood Updated:  02/05/17 0856    Narrative:       The following orders were created for panel order CBC & Differential.  Procedure                               Abnormality         Status                     ---------                               -----------         ------                     CBC Auto Differential[[id number]]         Abnormal            Final result                 Please view results for these tests on the individual orders.    CBC Auto Differential [[id number]]  (Abnormal) Collected:  02/05/17 0834    Specimen:  Blood from Arm, Left Updated:  02/05/17 0856     WBC 8.27 10*3/mm3      RBC 4.71 (L) 10*6/mm3      Hemoglobin 14.3 g/dL      Hematocrit 40.9 %      MCV 86.8 fL      MCH 30.4 pg      MCHC 35.0 g/dL      RDW 13.0 %      RDW-SD 41.5 fl      MPV 9.7 fL      Platelets 239 10*3/mm3      Neutrophil % 65.7 %      Lymphocyte % 25.0 %      Monocyte % 7.9 %      Eosinophil % 1.0 %      Basophil % 0.2 %      Immature Grans % 0.2 %      Neutrophils, Absolute 5.43 10*3/mm3      Lymphocytes, Absolute 2.07 10*3/mm3      Monocytes, Absolute 0.65 10*3/mm3      Eosinophils, Absolute 0.08 10*3/mm3      Basophils, Absolute 0.02 10*3/mm3      Immature Grans,  Absolute 0.02 10*3/mm3     Comprehensive Metabolic Panel [[id number]] Collected:  02/05/17 0833    Specimen:  Blood from Arm, Left Updated:  02/05/17 0857     Glucose 95 mg/dL      BUN 13 mg/dL      Creatinine 0.67 mg/dL      Sodium 141 mmol/L      Potassium 3.9 mmol/L      Chloride 104 mmol/L      CO2 27.0 mmol/L      Calcium 9.0 mg/dL      Total Protein 7.0 g/dL      Albumin 4.20 g/dL      ALT (SGPT) 41 U/L      AST (SGOT) 21 U/L      Alkaline Phosphatase 41 U/L      Total Bilirubin 0.5 mg/dL      eGFR Non African Amer 146 mL/min/1.73      Globulin 2.8 gm/dL      A/G Ratio 1.5 g/dL      BUN/Creatinine Ratio 19.4      Anion Gap 10.0 mmol/L     CBC & Differential [[id number]] Collected:  02/05/17 1219    Specimen:  Blood Updated:  02/05/17 1227    Narrative:       The following orders were created for panel order CBC & Differential.  Procedure                               Abnormality         Status                     ---------                               -----------         ------                     CBC Auto Differential[[id number]]         Abnormal            Final result                 Please view results for these tests on the individual orders.    CBC Auto Differential [[id number]]  (Abnormal) Collected:  02/05/17 1219    Specimen:  Blood Updated:  02/05/17 1227     WBC 7.51 10*3/mm3      RBC 4.63 (L) 10*6/mm3      Hemoglobin 14.1 g/dL      Hematocrit 40.3 %      MCV 87.0 fL      MCH 30.5 pg      MCHC 35.0 g/dL      RDW 13.0 %      RDW-SD 41.4 fl      MPV 9.5 fL      Platelets 232 10*3/mm3      Neutrophil % 67.1 %      Lymphocyte % 23.7 %      Monocyte % 7.5 %      Eosinophil % 1.1 %      Basophil % 0.3 %      Immature Grans % 0.3 %      Neutrophils, Absolute 5.05 10*3/mm3      Lymphocytes, Absolute 1.78 10*3/mm3      Monocytes, Absolute 0.56 10*3/mm3      Eosinophils, Absolute 0.08 10*3/mm3      Basophils, Absolute 0.02 10*3/mm3      Immature Grans, Absolute 0.02 10*3/mm3     Basic Metabolic Panel [[id number]]   (Normal) Collected:  02/05/17 1219    Specimen:  Blood Updated:  02/05/17 1238     Glucose 93 mg/dL      BUN 14 mg/dL      Creatinine 0.69 mg/dL      Sodium 140 mmol/L      Potassium 4.3 mmol/L      Chloride 106 mmol/L      CO2 25.0 mmol/L      Calcium 9.0 mg/dL      eGFR Non African Amer 141 mL/min/1.73      BUN/Creatinine Ratio 20.3      Anion Gap 9.0 mmol/L     Narrative:       GFR Normal >60  Chronic Kidney Disease <60  Kidney Failure <15    CBC Auto Differential [[id number]]  (Abnormal) Collected:  02/06/17 0515    Specimen:  Blood Updated:  02/06/17 0551     WBC 6.13 10*3/mm3      RBC 4.50 (L) 10*6/mm3      Hemoglobin 13.4 (L) g/dL      Hematocrit 39.6 (L) %      MCV 88.0 fL      MCH 29.8 pg      MCHC 33.8 g/dL      RDW 13.2 %      RDW-SD 42.2 fl      MPV 9.9 fL      Platelets 211 10*3/mm3      Neutrophil % 47.8 %      Lymphocyte % 41.4 %      Monocyte % 5.7 %      Eosinophil % 4.4 (H) %      Basophil % 0.5 %      Immature Grans % 0.2 %      Neutrophils, Absolute 2.93 10*3/mm3      Lymphocytes, Absolute 2.54 10*3/mm3      Monocytes, Absolute 0.35 10*3/mm3      Eosinophils, Absolute 0.27 10*3/mm3      Basophils, Absolute 0.03 10*3/mm3      Immature Grans, Absolute 0.01 10*3/mm3     CBC & Differential [[id number]] Collected:  02/06/17 0515    Specimen:  Blood Updated:  02/06/17 0551    Narrative:       The following orders were created for panel order CBC & Differential.  Procedure                               Abnormality         Status                     ---------                               -----------         ------                     CBC Auto Differential[[id number]]         Abnormal            Final result                 Please view results for these tests on the individual orders.    Basic Metabolic Panel [[id number]]  (Abnormal) Collected:  02/06/17 0515    Specimen:  Blood Updated:  02/06/17 0601     Glucose 103 (H) mg/dL      BUN 13 mg/dL      Creatinine 0.71 mg/dL      Sodium 139 mmol/L      Potassium  3.4 (L) mmol/L      Chloride 104 mmol/L      CO2 25.0 mmol/L      Calcium 8.9 mg/dL      eGFR Non African Amer 136 mL/min/1.73      BUN/Creatinine Ratio 18.3      Anion Gap 10.0 mmol/L     Narrative:       GFR Normal >60  Chronic Kidney Disease <60  Kidney Failure <15          Hospital Course  Patient is a 26 y.o. male presented with a history of spina bifida, absence of corpus callosum, and a ventriculoperitoneal shunt.  I received a call from our transfer center.  He had presented to an outside emergency department with a 12 hour history of head tremors.  He has had these episodes in the past and they have suggested that shunt failure was the cause of them.  With shunt revisions made then resolved.  I accepted him in transfer.  A CT of head without contrast showed no signs of hydrocephalus and no acute findings.  A shunt series suggested a rather large fibrosed region in the neck of the shunt tubing.  He suggested to me that the shunt began leaking in that region around 3 years ago and they did a surgical excision corrected the shunt in the put it back in hence the scarring around.  He does have a vertical scar on examination above that region.  Neurosurgery was consulted and examined the patient.  They suggested there was no signs of shunt failure currently.    I had loaded him with Depakote at an outside [facility] with concerns of seizure.  The head tremor had resolved by the time he got here yesterday.  Early this morning a routine EEG was done and was unremarkable.  Not long after completion he began having stereotypic head movements again.  I would describe it is fine tremors of the head and there were bilateral features of them.  They were very atypical nerve fashion and had no alteration of consciousness associated with them.  A second EEG was done in a stat fashion in order to capture these.  The head tremor was captured on a continuous EEG and there were no signs of EEG correlation to suggest an  epileptic etiology.  He was given Depakote again.  He will be discharged home today on a dose of 500 mg twice a day of Depakote.  I was just if anything these are myoclonic events although I cannot rule out a psychogenic movement disorder.  He will have follow-up with Dr. Red Germain in neurosurgery in the next 2 weeks and will be offered a follow-up in my clinic in the next 3-4 weeks.      Discharge Disposition  Home or Self Care    Discharge Medications   Chemo Harper   Home Medication Instructions KAYY:[id number]    Printed on:02/06/17 1234   Medication Information                      divalproex (DEPAKOTE) 250 MG DR tablet  Take 2 tablets by mouth 2 (Two) Times a Day.             raNITIdine (ZANTAC) 75 MG tablet  Take 75 mg by mouth 2 (Two) Times a Day.                 Discharge Diet:  regular    Activity at Discharge:     Follow-up Appointments   neurosurgery in 2 weeks  Neurology in 3 weeks    Test Results Pending at Discharge  none     Kun Camacho MD  02/06/17  12:39 PM    Time: Discharge 40 min

## 2017-02-06 NOTE — PLAN OF CARE
Problem: Patient Care Overview (Adult)  Goal: Plan of Care Review  Outcome: Ongoing (interventions implemented as appropriate)    02/06/17 0323   Coping/Psychosocial Response Interventions   Plan Of Care Reviewed With patient   Patient Care Overview   Progress no change   Outcome Evaluation   Outcome Summary/Follow up Plan patient has not had any witnessed tremors throughout the night. He has been self cathing and has been sleeping most of the night. He c/o chest tightness at the beginning of the shift. Dr blair was notified and a STAT EKG was obtained. See orders        Goal: Adult Individualization and Mutuality  Outcome: Ongoing (interventions implemented as appropriate)    Problem: Fall Risk (Adult)  Goal: Identify Related Risk Factors and Signs and Symptoms  Outcome: Outcome(s) achieved Date Met:  02/06/17  Goal: Absence of Falls  Outcome: Ongoing (interventions implemented as appropriate)

## 2017-02-06 NOTE — PLAN OF CARE
Problem: Patient Care Overview (Adult)  Goal: Plan of Care Review  Outcome: Ongoing (interventions implemented as appropriate)    02/06/17 3560   Coping/Psychosocial Response Interventions   Plan Of Care Reviewed With patient;mother   Patient Care Overview   Progress no change   Outcome Evaluation   Outcome Summary/Follow up Plan Pt had tremors this am. Started in his head. Followed with his eyes blinking very quickly. Dr Camacho witnessed and called for stat eeg. EEG was negative. Depakote given and was effective. Pt rested this afternoon. Pt to dc home with mom this evening with FU appts with Dr Camacoh and Dr Germain. Doug spent time discussing plan with mother. Mother v/u.        Goal: Adult Individualization and Mutuality  Outcome: Ongoing (interventions implemented as appropriate)  Goal: Discharge Needs Assessment  Outcome: Ongoing (interventions implemented as appropriate)    Problem: Fall Risk (Adult)  Goal: Absence of Falls  Outcome: Ongoing (interventions implemented as appropriate)

## 2017-02-28 ENCOUNTER — OFFICE VISIT (OUTPATIENT)
Dept: NEUROSURGERY | Facility: CLINIC | Age: 27
End: 2017-02-28

## 2017-02-28 VITALS
BODY MASS INDEX: 27.88 KG/M2 | HEIGHT: 60 IN | SYSTOLIC BLOOD PRESSURE: 92 MMHG | DIASTOLIC BLOOD PRESSURE: 58 MMHG | WEIGHT: 142 LBS

## 2017-02-28 DIAGNOSIS — E66.3 OVER WEIGHT: ICD-10-CM

## 2017-02-28 DIAGNOSIS — Q03.9 CONGENITAL HYDROCEPHALUS (HCC): Primary | ICD-10-CM

## 2017-02-28 DIAGNOSIS — Z78.9 NON-SMOKER: ICD-10-CM

## 2017-02-28 PROCEDURE — 99213 OFFICE O/P EST LOW 20 MIN: CPT | Performed by: NURSE PRACTITIONER

## 2017-02-28 RX ORDER — DIVALPROEX SODIUM 250 MG/1
250 TABLET, DELAYED RELEASE ORAL EVERY 6 HOURS
Qty: 120 TABLET | Refills: 1 | Status: SHIPPED | OUTPATIENT
Start: 2017-02-28 | End: 2017-03-27 | Stop reason: DRUGHIGH

## 2017-02-28 NOTE — PROGRESS NOTES
"    Chief complaint:   Chief Complaint   Patient presents with   • Hospital follow up     Patient is here after a recent hospital stay for shunt issues and headaches.  He states he is doing better.          Subjective     HPI: This is a 27-year-old male gentleman who we saw in the hospital for possible shunt malfunction.  He is here in follow-up today.  Per the patient and his family that the like is doing well in his back at his baseline at this point.  Not having any head tremors.  Denies any headache.  Denies any nausea vomiting.  Denies any vision changes.  Overall left like is doing good and is able to transfer himself and is essentially back at his neurologic baseline.  The patient can walk with crutches but for the most part he does sustain a wheelchair.    Review of Systems   Musculoskeletal: Negative.    Neurological: Negative.          Objective      Vital Signs  Visit Vitals   • BP 92/58 (BP Location: Right arm, Patient Position: Sitting)   • Ht 60\" (152.4 cm)   • Wt 142 lb (64.4 kg)   • BMI 27.73 kg/m2       Physical Exam   Constitutional: He is oriented to person, place, and time. He appears well-developed and well-nourished.   HENT:   Head: Normocephalic.   Eyes: EOM are normal. Pupils are equal, round, and reactive to light.   Neck: Normal range of motion.   Pulmonary/Chest: Effort normal.   Musculoskeletal: Normal range of motion.   Weakness in lower extremities.   Neurological: He is alert and oriented to person, place, and time. He has normal strength and normal reflexes. No cranial nerve deficit or sensory deficit. Gait normal. GCS eye subscore is 4. GCS verbal subscore is 5. GCS motor subscore is 6.   Skin: Skin is warm.   Psychiatric: He has a normal mood and affect. His speech is normal and behavior is normal. Thought content normal.       Results Review: No new imaging          Assessment/Plan: At this point the patient is doing good.  I am a refill his seizure medication.  We will follow-up " with him in 1 year for just as routine shunt follow-up.  If he has any problems in the meantime is told to give us a call.  BMI shows that is overweight.  BMI chart was given the patient.  He is a nonsmoker.  He will follow-up with Dr. Germain.     I discussed the patients findings and my recommendations with patient  John Willoughby, BRENNON  02/28/17  11:21 AM

## 2017-03-08 ENCOUNTER — TELEPHONE (OUTPATIENT)
Dept: NEUROLOGY | Facility: CLINIC | Age: 27
End: 2017-03-08

## 2017-03-08 NOTE — TELEPHONE ENCOUNTER
He has had an emergency and must be seen at a different practice today. He needs to cancel and reschedule his appt with this office.

## 2017-03-27 ENCOUNTER — OFFICE VISIT (OUTPATIENT)
Dept: NEUROLOGY | Facility: CLINIC | Age: 27
End: 2017-03-27

## 2017-03-27 VITALS
WEIGHT: 135 LBS | DIASTOLIC BLOOD PRESSURE: 64 MMHG | BODY MASS INDEX: 26.5 KG/M2 | HEART RATE: 80 BPM | HEIGHT: 60 IN | SYSTOLIC BLOOD PRESSURE: 108 MMHG

## 2017-03-27 DIAGNOSIS — Q03.9 CONGENITAL HYDROCEPHALUS (HCC): ICD-10-CM

## 2017-03-27 DIAGNOSIS — Z87.728 HISTORY OF SPINA BIFIDA: ICD-10-CM

## 2017-03-27 DIAGNOSIS — Z78.9 NON-SMOKER: ICD-10-CM

## 2017-03-27 DIAGNOSIS — E66.3 OVER WEIGHT: ICD-10-CM

## 2017-03-27 DIAGNOSIS — G25.3 MYOCLONUS: Primary | ICD-10-CM

## 2017-03-27 PROCEDURE — 99213 OFFICE O/P EST LOW 20 MIN: CPT | Performed by: CLINICAL NURSE SPECIALIST

## 2017-03-27 RX ORDER — DIVALPROEX SODIUM 250 MG/1
250 TABLET, DELAYED RELEASE ORAL 2 TIMES DAILY
Qty: 60 TABLET | Refills: 1 | Status: SHIPPED | OUTPATIENT
Start: 2017-03-27 | End: 2018-01-23 | Stop reason: SDDI

## 2017-03-27 RX ORDER — HYDROCODONE BITARTRATE AND ACETAMINOPHEN 5; 325 MG/1; MG/1
1 TABLET ORAL EVERY 6 HOURS PRN
COMMUNITY
End: 2017-05-04

## 2017-03-27 NOTE — PROGRESS NOTES
Subjective     Chief Complaint   Patient presents with   • Neurologic Problem     No new concerns or complaints       Chemo Harper is a 27 y.o. male right handed on disability.  He is here today for hospital follow up for atypical myoclonic movements of the head. He was accepted in transfer to [facility] 2/5/17.  Details of the head tremor below. He was d/c on 2/6/17. He has been taking Depakote 250 mg every 6 hours as the 500 mg BID was causing nausea. Since hospital d/c he has had no further head tremors.  He has returned to his baseline. He is accompanied by his mother who states for the last month has had changes in his urine and is to seek urologist later today. The  Patient does have significant bilateral flank pain and is repositioning himself frequently.      HPI Comments: On 2/5 the patient was accepted from outside facility for head tremor. He had been loaded with Depakote at an outside Medical [facility] with concerns of seizure.  The head tremor had resolved by the time he arrived.  Early the next morning a routine EEG was done and was unremarkable.  Not long after completion he began having stereotypic head movements again.  The tremors  would  Be describe it is fine tremors of the head and there were bilateral features of them.  They were very atypical nerve fashion and had no alteration of consciousness associated with them.  A second EEG was done in a stat fashion in order to capture these.  The head tremor was captured on a continuous EEG and there were no signs of EEG correlation to suggest an epileptic etiology.  He was given Depakote again.  He was discharged home today on a dose of 500 mg twice a day of Depakote.  Dr. ROBERT Camacho was considering the movements  myoclonic events and could not rule out a psychogenic movement disorder.     Neurologic Problem   The patient's primary symptoms include weakness (bilateral lowe extremities). Primary symptoms comment: head tremor. This is a new problem. The  current episode started more than 1 month ago (2/5/17). The problem has been resolved since onset. Pertinent negatives include no aura, bladder incontinence, bowel incontinence, chest pain, confusion, dizziness, fatigue, fever, headaches, nausea or vomiting. Treatments tried: depakote. Bleeding disorder: spina bifida, congenital hydrocephalus, s/p  shunt.        Current Outpatient Prescriptions   Medication Sig Dispense Refill   • HYDROcodone-acetaminophen (NORCO) 5-325 MG per tablet Take 1 tablet by mouth Every 6 (Six) Hours As Needed.     • raNITIdine (ZANTAC) 75 MG tablet Take 75 mg by mouth As Needed.     • divalproex (DEPAKOTE) 250 MG DR tablet Take 1 tablet by mouth 2 (Two) Times a Day. 60 tablet 1     No current facility-administered medications for this visit.        Past Medical History:   Diagnosis Date   • Spina bifida        Past Surgical History:   Procedure Laterality Date   • SHUNT REVISION     •  SHUNT INSERTION         family history includes No Known Problems in his father and mother.    Social History   Substance Use Topics   • Smoking status: Never Smoker   • Smokeless tobacco: Never Used   • Alcohol use No       Review of Systems   Constitutional: Negative for fatigue and fever.   HENT: Negative for rhinorrhea, sneezing and trouble swallowing.    Eyes: Negative for visual disturbance.   Respiratory: Negative for apnea and cough.    Cardiovascular: Negative for chest pain.   Gastrointestinal: Negative for bowel incontinence, constipation, diarrhea, nausea and vomiting.   Genitourinary: Negative for bladder incontinence, dysuria and frequency.   Musculoskeletal: Positive for gait problem. Negative for arthralgias and myalgias.        Atrophy of LEs   Neurological: Positive for weakness (bilateral lowe extremities). Negative for dizziness, speech difficulty and headaches.   Hematological: Negative for adenopathy.   Psychiatric/Behavioral: Negative for agitation and confusion.       Objective  "    /64  Pulse 80  Ht 60\" (152.4 cm)  Wt 135 lb (61.2 kg)  BMI 26.37 kg/m2, Body mass index is 26.37 kg/(m^2).    Physical Exam   Constitutional: He is oriented to person, place, and time. Vital signs are normal. He appears well-developed and well-nourished.   HENT:   Head: Normocephalic and atraumatic.   Right Ear: Hearing and external ear normal.   Left Ear: Hearing and external ear normal.   Nose: Nose normal.   Mouth/Throat: Uvula is midline, oropharynx is clear and moist and mucous membranes are normal.   Eyes: EOM and lids are normal. Pupils are equal, round, and reactive to light.   Neck: Trachea normal. Neck supple. Carotid bruit is not present.   Cardiovascular: Normal rate, regular rhythm, S1 normal, S2 normal and normal heart sounds.    Pulmonary/Chest: Effort normal and breath sounds normal.   Abdominal: Soft. Bowel sounds are normal.   Musculoskeletal: Normal range of motion.   Neurological: He is alert and oriented to person, place, and time. He has normal strength. He displays no tremor. No cranial nerve deficit or sensory deficit. He exhibits normal muscle tone. He displays a negative Romberg sign. Gait abnormal. Coordination normal.   Reflex Scores:       Tricep reflexes are 2+ on the right side and 2+ on the left side.       Bicep reflexes are 2+ on the right side and 2+ on the left side.       Brachioradialis reflexes are 2+ on the right side and 2+ on the left side.       Patellar reflexes are 4+ on the right side and 4+ on the left side.       Achilles reflexes are 4+ on the right side and 4+ on the left side.  Bilateral upper extremities equal and full strength    Bilateral lower extremities 3/5 proximally and 2/5 distally.  He does wear braces on lower extremities.   He can ambulate with arm crutches but uses a wheel chair for long distances.     Skin: Skin is warm and dry.   Psychiatric: He has a normal mood and affect. His speech is normal and behavior is normal. Cognition and " memory are normal.   Nursing note and vitals reviewed.      Results for orders placed or performed during the hospital encounter of 02/05/17   Comprehensive Metabolic Panel   Result Value Ref Range    Glucose 95 70 - 100 mg/dL    BUN 13 5 - 21 mg/dL    Creatinine 0.67 0.50 - 1.40 mg/dL    Sodium 141 135 - 145 mmol/L    Potassium 3.9 3.5 - 5.3 mmol/L    Chloride 104 98 - 110 mmol/L    CO2 27.0 24.0 - 31.0 mmol/L    Calcium 9.0 8.4 - 10.4 mg/dL    Total Protein 7.0 6.3 - 8.7 g/dL    Albumin 4.20 3.50 - 5.00 g/dL    ALT (SGPT) 41 0 - 54 U/L    AST (SGOT) 21 7 - 45 U/L    Alkaline Phosphatase 41 24 - 120 U/L    Total Bilirubin 0.5 0.1 - 1.0 mg/dL    eGFR Non African Amer 146 >60 mL/min/1.73    Globulin 2.8 gm/dL    A/G Ratio 1.5 1.1 - 2.5 g/dL    BUN/Creatinine Ratio 19.4 7.0 - 25.0    Anion Gap 10.0 4.0 - 13.0 mmol/L   CBC Auto Differential   Result Value Ref Range    WBC 8.27 4.80 - 10.80 10*3/mm3    RBC 4.71 (L) 4.80 - 5.90 10*6/mm3    Hemoglobin 14.3 14.0 - 18.0 g/dL    Hematocrit 40.9 40.0 - 52.0 %    MCV 86.8 82.0 - 95.0 fL    MCH 30.4 28.0 - 32.0 pg    MCHC 35.0 33.0 - 36.0 g/dL    RDW 13.0 12.0 - 15.0 %    RDW-SD 41.5 40.0 - 54.0 fl    MPV 9.7 6.0 - 12.0 fL    Platelets 239 130 - 400 10*3/mm3    Neutrophil % 65.7 39.0 - 78.0 %    Lymphocyte % 25.0 15.0 - 45.0 %    Monocyte % 7.9 4.0 - 12.0 %    Eosinophil % 1.0 0.0 - 4.0 %    Basophil % 0.2 0.0 - 2.0 %    Immature Grans % 0.2 0.0 - 5.0 %    Neutrophils, Absolute 5.43 1.87 - 8.40 10*3/mm3    Lymphocytes, Absolute 2.07 0.72 - 4.86 10*3/mm3    Monocytes, Absolute 0.65 0.19 - 1.30 10*3/mm3    Eosinophils, Absolute 0.08 0.00 - 0.70 10*3/mm3    Basophils, Absolute 0.02 0.00 - 0.20 10*3/mm3    Immature Grans, Absolute 0.02 0.00 - 0.03 10*3/mm3   Basic Metabolic Panel   Result Value Ref Range    Glucose 93 70 - 100 mg/dL    BUN 14 5 - 21 mg/dL    Creatinine 0.69 0.50 - 1.40 mg/dL    Sodium 140 135 - 145 mmol/L    Potassium 4.3 3.5 - 5.3 mmol/L    Chloride 106 98 - 110  mmol/L    CO2 25.0 24.0 - 31.0 mmol/L    Calcium 9.0 8.4 - 10.4 mg/dL    eGFR Non African Amer 141 >60 mL/min/1.73    BUN/Creatinine Ratio 20.3 7.0 - 25.0    Anion Gap 9.0 4.0 - 13.0 mmol/L   CBC Auto Differential   Result Value Ref Range    WBC 7.51 4.80 - 10.80 10*3/mm3    RBC 4.63 (L) 4.80 - 5.90 10*6/mm3    Hemoglobin 14.1 14.0 - 18.0 g/dL    Hematocrit 40.3 40.0 - 52.0 %    MCV 87.0 82.0 - 95.0 fL    MCH 30.5 28.0 - 32.0 pg    MCHC 35.0 33.0 - 36.0 g/dL    RDW 13.0 12.0 - 15.0 %    RDW-SD 41.4 40.0 - 54.0 fl    MPV 9.5 6.0 - 12.0 fL    Platelets 232 130 - 400 10*3/mm3    Neutrophil % 67.1 39.0 - 78.0 %    Lymphocyte % 23.7 15.0 - 45.0 %    Monocyte % 7.5 4.0 - 12.0 %    Eosinophil % 1.1 0.0 - 4.0 %    Basophil % 0.3 0.0 - 2.0 %    Immature Grans % 0.3 0.0 - 5.0 %    Neutrophils, Absolute 5.05 1.87 - 8.40 10*3/mm3    Lymphocytes, Absolute 1.78 0.72 - 4.86 10*3/mm3    Monocytes, Absolute 0.56 0.19 - 1.30 10*3/mm3    Eosinophils, Absolute 0.08 0.00 - 0.70 10*3/mm3    Basophils, Absolute 0.02 0.00 - 0.20 10*3/mm3    Immature Grans, Absolute 0.02 0.00 - 0.03 10*3/mm3   Basic Metabolic Panel   Result Value Ref Range    Glucose 103 (H) 70 - 100 mg/dL    BUN 13 5 - 21 mg/dL    Creatinine 0.71 0.50 - 1.40 mg/dL    Sodium 139 135 - 145 mmol/L    Potassium 3.4 (L) 3.5 - 5.3 mmol/L    Chloride 104 98 - 110 mmol/L    CO2 25.0 24.0 - 31.0 mmol/L    Calcium 8.9 8.4 - 10.4 mg/dL    eGFR Non African Amer 136 >60 mL/min/1.73    BUN/Creatinine Ratio 18.3 7.0 - 25.0    Anion Gap 10.0 4.0 - 13.0 mmol/L   CBC Auto Differential   Result Value Ref Range    WBC 6.13 4.80 - 10.80 10*3/mm3    RBC 4.50 (L) 4.80 - 5.90 10*6/mm3    Hemoglobin 13.4 (L) 14.0 - 18.0 g/dL    Hematocrit 39.6 (L) 40.0 - 52.0 %    MCV 88.0 82.0 - 95.0 fL    MCH 29.8 28.0 - 32.0 pg    MCHC 33.8 33.0 - 36.0 g/dL    RDW 13.2 12.0 - 15.0 %    RDW-SD 42.2 40.0 - 54.0 fl    MPV 9.9 6.0 - 12.0 fL    Platelets 211 130 - 400 10*3/mm3    Neutrophil % 47.8 39.0 - 78.0 %     Lymphocyte % 41.4 15.0 - 45.0 %    Monocyte % 5.7 4.0 - 12.0 %    Eosinophil % 4.4 (H) 0.0 - 4.0 %    Basophil % 0.5 0.0 - 2.0 %    Immature Grans % 0.2 0.0 - 5.0 %    Neutrophils, Absolute 2.93 1.87 - 8.40 10*3/mm3    Lymphocytes, Absolute 2.54 0.72 - 4.86 10*3/mm3    Monocytes, Absolute 0.35 0.19 - 1.30 10*3/mm3    Eosinophils, Absolute 0.27 0.00 - 0.70 10*3/mm3    Basophils, Absolute 0.03 0.00 - 0.20 10*3/mm3    Immature Grans, Absolute 0.01 0.00 - 0.03 10*3/mm3      CT HEAD:IMPRESSION:  1. 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 There is no evidence of hydrocephalus. LEFT posterior parietal  approach ventriculostomy shunt catheter appears to terminate in the body  of the LEFT lateral ventricle.      EEG:Interpretation:  Unremarkable EEG recording suggesting that the head tremors are nonepileptic in their etiology.      EEG:Interpretation: This is a normal awake EEG recording. No head twitching is noted by the technician throughout the study.     ASSESSMENT/PLAN    Diagnoses and all orders for this visit:    Myoclonus  -     CBC & Differential; Future  -     Comprehensive Metabolic Panel; Future  -     Valproic Acid Level, Total; Future  -     Ammonia; Future    Congenital hydrocephalus  -     CBC & Differential; Future  -     Comprehensive Metabolic Panel; Future  -     Valproic Acid Level, Total; Future  -     Ammonia; Future    Non-smoker  -     CBC & Differential; Future  -     Comprehensive Metabolic Panel; Future  -     Valproic Acid Level, Total; Future  -     Ammonia; Future    Over weight  -     CBC & Differential; Future  -     Comprehensive Metabolic Panel; Future  -     Valproic Acid Level, Total; Future  -     Ammonia; Future    History of spina bifida    Other orders  -     HYDROcodone-acetaminophen (NORCO) 5-325 MG per tablet; Take 1 tablet by mouth Every 6 (Six) Hours As Needed.  -     divalproex (DEPAKOTE) 250 MG DR tablet; Take 1 tablet by mouth 2 (Two)  Times a Day.    MEDICAL DECISION MAKIN. Decrease Depakote 250 mg BID  2. Uncertain etiology of head tremor. While in hospital was able to obtain EEG during episode and were non epileptic. Possible psychogenic in nature.  shunt appears to be functioning per neurosurgery evaluation.  3. Seizure precautions were discussed to include no tub baths, no swimming, avoiding lack of sleep, and avoiding known triggers. Education given of things that may contribute to a seizure to include, but not limited to: stressful situations, fever, fatigue, lack of sleep, low blood sugar, hyperventilation, flashing lights, and caffeine. Instructions given to take seizure medications as prescribed. Education given to family member on what to do during a seizure and care following the seizure. Education given to contact this office prior to stopping or changing any medications.  4. Plan to discontinue Depakote at next visit.     allergies and all known medications/prescriptions have been reviewed using resources available on this encounter.    Return in about 6 weeks (around 2017).        BRENNON Choi

## 2017-05-04 ENCOUNTER — OFFICE VISIT (OUTPATIENT)
Dept: NEUROLOGY | Facility: CLINIC | Age: 27
End: 2017-05-04

## 2017-05-04 VITALS
WEIGHT: 135 LBS | DIASTOLIC BLOOD PRESSURE: 80 MMHG | BODY MASS INDEX: 26.5 KG/M2 | SYSTOLIC BLOOD PRESSURE: 114 MMHG | HEIGHT: 60 IN | HEART RATE: 76 BPM

## 2017-05-04 DIAGNOSIS — Z98.2 VP (VENTRICULOPERITONEAL) SHUNT STATUS: ICD-10-CM

## 2017-05-04 DIAGNOSIS — Q03.9 CONGENITAL HYDROCEPHALUS (HCC): ICD-10-CM

## 2017-05-04 DIAGNOSIS — Z87.728 HISTORY OF SPINA BIFIDA: ICD-10-CM

## 2017-05-04 DIAGNOSIS — Z78.9 NON-SMOKER: ICD-10-CM

## 2017-05-04 DIAGNOSIS — G25.3 MYOCLONUS: Primary | ICD-10-CM

## 2017-05-04 PROCEDURE — 99213 OFFICE O/P EST LOW 20 MIN: CPT | Performed by: CLINICAL NURSE SPECIALIST

## 2017-05-04 RX ORDER — OXYCODONE HYDROCHLORIDE AND ACETAMINOPHEN 5; 325 MG/1; MG/1
1 TABLET ORAL EVERY 6 HOURS PRN
COMMUNITY

## 2017-05-11 ENCOUNTER — OFFICE VISIT (OUTPATIENT)
Dept: NEUROSURGERY | Facility: CLINIC | Age: 27
End: 2017-05-11

## 2017-05-11 VITALS
SYSTOLIC BLOOD PRESSURE: 112 MMHG | WEIGHT: 130 LBS | HEIGHT: 65 IN | BODY MASS INDEX: 21.66 KG/M2 | DIASTOLIC BLOOD PRESSURE: 68 MMHG

## 2017-05-11 DIAGNOSIS — IMO0001 NORMAL BODY MASS INDEX (BMI): ICD-10-CM

## 2017-05-11 DIAGNOSIS — Z78.9 NON-SMOKER: ICD-10-CM

## 2017-05-11 DIAGNOSIS — G25.3 MYOCLONUS: ICD-10-CM

## 2017-05-11 DIAGNOSIS — Q03.9 CONGENITAL HYDROCEPHALUS (HCC): ICD-10-CM

## 2017-05-11 DIAGNOSIS — R22.0 SCALP MASS: Primary | ICD-10-CM

## 2017-05-11 DIAGNOSIS — Z87.728 HISTORY OF SPINA BIFIDA: ICD-10-CM

## 2017-05-11 PROCEDURE — 99213 OFFICE O/P EST LOW 20 MIN: CPT | Performed by: NEUROLOGICAL SURGERY

## 2017-05-19 DIAGNOSIS — G25.3 MYOCLONUS: ICD-10-CM

## 2017-05-19 DIAGNOSIS — Z78.9 NON-SMOKER: ICD-10-CM

## 2017-05-19 DIAGNOSIS — E66.3 OVER WEIGHT: ICD-10-CM

## 2017-05-19 DIAGNOSIS — Q03.9 CONGENITAL HYDROCEPHALUS (HCC): ICD-10-CM

## 2017-06-06 ENCOUNTER — HOSPITAL ENCOUNTER (OUTPATIENT)
Dept: CT IMAGING | Facility: HOSPITAL | Age: 27
Discharge: HOME | End: 2017-06-06
Attending: NEUROLOGICAL SURGERY

## 2017-11-08 ENCOUNTER — TELEPHONE (OUTPATIENT)
Dept: NEUROSURGERY | Facility: CLINIC | Age: 27
End: 2017-11-08

## 2017-11-08 NOTE — TELEPHONE ENCOUNTER
Patient's mom called stating the patient was weaned off his Depakote by Mary Burleson and now his head is trembling again.  Per Mary's note: patient's mother instructed to contact their office if head began trembling.  So I instructed the mother to contact their office for further instructions.    mary reich CMA

## 2017-11-20 ENCOUNTER — OFFICE VISIT (OUTPATIENT)
Dept: NEUROLOGY | Facility: CLINIC | Age: 27
End: 2017-11-20

## 2017-11-20 VITALS
SYSTOLIC BLOOD PRESSURE: 100 MMHG | WEIGHT: 130 LBS | HEIGHT: 65 IN | HEART RATE: 72 BPM | BODY MASS INDEX: 21.66 KG/M2 | DIASTOLIC BLOOD PRESSURE: 60 MMHG

## 2017-11-20 DIAGNOSIS — Z98.2 VP (VENTRICULOPERITONEAL) SHUNT STATUS: ICD-10-CM

## 2017-11-20 DIAGNOSIS — Q03.9 CONGENITAL HYDROCEPHALUS (HCC): ICD-10-CM

## 2017-11-20 DIAGNOSIS — Z87.728 HISTORY OF SPINA BIFIDA: ICD-10-CM

## 2017-11-20 DIAGNOSIS — IMO0001 NORMAL BODY MASS INDEX (BMI): ICD-10-CM

## 2017-11-20 DIAGNOSIS — Z78.9 NON-SMOKER: ICD-10-CM

## 2017-11-20 DIAGNOSIS — G25.3 MYOCLONUS: Primary | ICD-10-CM

## 2017-11-20 PROCEDURE — 99213 OFFICE O/P EST LOW 20 MIN: CPT | Performed by: CLINICAL NURSE SPECIALIST

## 2017-11-20 RX ORDER — QUETIAPINE FUMARATE 50 MG/1
50 TABLET, FILM COATED ORAL NIGHTLY
COMMUNITY

## 2017-11-20 RX ORDER — TIZANIDINE 4 MG/1
4 TABLET ORAL DAILY PRN
Qty: 20 TABLET | Refills: 0 | Status: SHIPPED | OUTPATIENT
Start: 2017-11-20 | End: 2018-01-23

## 2017-11-20 RX ORDER — FLUOXETINE 10 MG/1
10 CAPSULE ORAL DAILY
COMMUNITY

## 2017-11-20 NOTE — PROGRESS NOTES
Subjective     Chief Complaint   Patient presents with   • Neurologic Problem       Chemo Harper is a 27 y.o. male right handed on disability. He is here today forfollow up for atypical myoclonic movements of the head. He was last seen 3/27/17. He is accompanied by his mother. He is playing on his phone the entire time during the interview and exam. At that time had wean and d/c depakote. The patient had done well until earlier this month and had at least 2 episodes of head tremor similar to events in 2/2017 that were captured on EEG and found to be nonepileptic. Apparently the mother tried to call her but there is no record. She did call Dr. Germain office who instructed to call neurology. At any rate, she restarted Depakote  mg BID on her own. Patient had at least one spell since being on Depakote.     He feels the lump by the shunt has enlarged and is about pecan size. Per Dr. Germain notes he was to have CT head but patient missed his appointment.         As you recall, He was accepted in transfer to [facility] 2/5/17. Details of the head tremor below. He was d/c on 2/6/17. He has been taking Depakote 250 mg every 6 hours as the 500 mg BID was causing nausea. He has returned to his baseline.         HPI Comments: On 2/5 the patient was accepted from outside facility for head tremor. He had been loaded with Depakote at an outside Medical Center with concerns of seizure.  The head tremor had resolved by the time he arrived.  Early the next morning a routine EEG was done and was unremarkable.  Not long after completion he began having stereotypic head movements again.  The tremors  would  Be describe it is fine tremors of the head and there were bilateral features of them.  They were very atypical nerve fashion and had no alteration of consciousness associated with them.  A second EEG was done in a stat fashion in order to capture these.  The head tremor was captured on a continuous EEG and there were no signs of  EEG correlation to suggest an epileptic etiology.  He was given Depakote again.  He was discharged home today on a dose of 500 mg twice a day of Depakote.  Dr. ROBERT Camacho was considering the movements  myoclonic events and could not rule out a psychogenic movement disorder.     Neurologic Problem   The patient's primary symptoms include weakness (bilateral lowe extremities). Primary symptoms comment: head tremor. This is a new problem. The current episode started more than 1 month ago (2/5/17). The problem has been resolved since onset. Pertinent negatives include no aura, bladder incontinence, bowel incontinence, chest pain, confusion, dizziness, fatigue, fever, headaches, nausea or vomiting. Treatments tried: depakote. Bleeding disorder: spina bifida, congenital hydrocephalus, s/p  shunt.        Current Outpatient Prescriptions   Medication Sig Dispense Refill   • divalproex (DEPAKOTE) 250 MG DR tablet Take 1 tablet by mouth 2 (Two) Times a Day. 60 tablet 1   • FLUoxetine (PROzac) 10 MG capsule Take 10 mg by mouth Daily.     • oxyCODONE-acetaminophen (PERCOCET) 5-325 MG per tablet Take 1 tablet by mouth Every 6 (Six) Hours As Needed.     • QUEtiapine (SEROquel) 50 MG tablet Take 50 mg by mouth Every Night.     • raNITIdine (ZANTAC) 75 MG tablet Take 75 mg by mouth As Needed.     • tiZANidine (ZANAFLEX) 4 MG tablet Take 1 tablet by mouth Daily As Needed for Muscle Spasms. 20 tablet 0     No current facility-administered medications for this visit.        Past Medical History:   Diagnosis Date   • Seizures    • Spina bifida        Past Surgical History:   Procedure Laterality Date   • SHUNT REVISION     •  SHUNT INSERTION         family history includes No Known Problems in his father and mother.    Social History   Substance Use Topics   • Smoking status: Never Smoker   • Smokeless tobacco: Never Used   • Alcohol use No       Review of Systems   Constitutional: Negative for fatigue and fever.   HENT: Negative  "for rhinorrhea, sneezing and trouble swallowing.    Eyes: Negative for visual disturbance.   Respiratory: Negative for apnea and cough.    Cardiovascular: Negative for chest pain.   Gastrointestinal: Negative for bowel incontinence, constipation, diarrhea, nausea and vomiting.   Genitourinary: Negative for bladder incontinence, dysuria and frequency.   Musculoskeletal: Positive for gait problem (uses arm crutches). Negative for arthralgias and myalgias.        Atrophy of LEs   Neurological: Positive for weakness (bilateral lowe extremities). Negative for dizziness, speech difficulty and headaches.   Hematological: Negative for adenopathy.   Psychiatric/Behavioral: Negative for agitation and confusion.       Objective     /60  Pulse 72  Ht 65\" (165.1 cm)  Wt 130 lb (59 kg)  BMI 21.63 kg/m2, Body mass index is 21.63 kg/(m^2).    Physical Exam   Constitutional: He is oriented to person, place, and time. Vital signs are normal. He appears well-developed and well-nourished.   HENT:   Head: Normocephalic and atraumatic.   Right Ear: Hearing and external ear normal.   Left Ear: Hearing and external ear normal.   Nose: Nose normal.   Mouth/Throat: Uvula is midline, oropharynx is clear and moist and mucous membranes are normal.   Eyes: Conjunctivae, EOM and lids are normal. Pupils are equal, round, and reactive to light.   Neck: Trachea normal and normal range of motion. Neck supple. Carotid bruit is not present.   Cardiovascular: Normal rate, regular rhythm, S1 normal, S2 normal and normal heart sounds.    Pulmonary/Chest: Effort normal and breath sounds normal.   Abdominal: Soft. Bowel sounds are normal.   Musculoskeletal: Normal range of motion.   Neurological: He is alert and oriented to person, place, and time. He has normal strength. He displays no tremor. No cranial nerve deficit or sensory deficit. He exhibits normal muscle tone. He displays a negative Romberg sign. Gait abnormal. Coordination normal. "   Reflex Scores:       Tricep reflexes are 2+ on the right side and 2+ on the left side.       Bicep reflexes are 2+ on the right side and 2+ on the left side.       Brachioradialis reflexes are 2+ on the right side and 2+ on the left side.       Patellar reflexes are 4+ on the right side and 4+ on the left side.       Achilles reflexes are 4+ on the right side and 4+ on the left side.  Bilateral upper extremities equal and full strength    Bilateral lower extremities 3/5 proximally and 2/5 distally.  He does wear braces on lower extremities.   He can ambulate with arm crutches but uses a wheel chair for long distances.     Skin: Skin is warm and dry.   Psychiatric: He has a normal mood and affect. His speech is normal and behavior is normal. Cognition and memory are normal.   Nursing note and vitals reviewed.           ASSESSMENT/PLAN    Diagnoses and all orders for this visit:    Myoclonus    Congenital hydrocephalus     (ventriculoperitoneal) shunt status    Non-smoker    History of spina bifida    Normal body mass index (BMI)    Other orders  -     FLUoxetine (PROzac) 10 MG capsule; Take 10 mg by mouth Daily.  -     QUEtiapine (SEROquel) 50 MG tablet; Take 50 mg by mouth Every Night.  -     tiZANidine (ZANAFLEX) 4 MG tablet; Take 1 tablet by mouth Daily As Needed for Muscle Spasms.    MEDICAL DECISION MAKIN. Wean and discontinue Depakote as previous work up revealed are non epileptic.  Patient has appointment with psychologist in 2018.  2. zanaflex 4 mg daily prn. Counseled on side effects.   3. Patient to f/u with neurosurgery for concerns/evaluation of shunt  4. Uncertain etiology of head tremor but had previously been captured and were non epileptic. May be encephalopathic but cannot entirely rule out psychogenic tremor.  5. Seizure precautions were discussed to include no tub baths, no swimming, avoiding lack of sleep, and avoiding known triggers. Education given of things that may contribute  to a seizure to include, but not limited to: stressful situations, fever, fatigue, lack of sleep, low blood sugar, hyperventilation, flashing lights, and caffeine. Instructions given to take seizure medications as prescribed. Education given to family member on what to do during a seizure and care following the seizure. Education given to contact this office prior to stopping or changing any medications.  6. I would like to see the patient after he has seen psychologist in January 2018.      7. Patient may benefit from klonopin      allergies and all known medications/prescriptions have been reviewed using resources available on this encounter.    Return in about 2 months (around 1/20/2018).        Anna Burleson, APRN

## 2018-01-23 ENCOUNTER — HOSPITAL ENCOUNTER (OUTPATIENT)
Dept: CT IMAGING | Facility: HOSPITAL | Age: 28
Discharge: HOME | End: 2018-01-23
Attending: NEUROLOGICAL SURGERY | Admitting: NEUROLOGICAL SURGERY

## 2018-01-23 ENCOUNTER — OFFICE VISIT (OUTPATIENT)
Dept: NEUROLOGY | Facility: CLINIC | Age: 28
End: 2018-01-23

## 2018-01-23 VITALS
WEIGHT: 130 LBS | DIASTOLIC BLOOD PRESSURE: 80 MMHG | HEART RATE: 78 BPM | BODY MASS INDEX: 21.66 KG/M2 | SYSTOLIC BLOOD PRESSURE: 110 MMHG | HEIGHT: 65 IN

## 2018-01-23 DIAGNOSIS — G25.3 MYOCLONUS: ICD-10-CM

## 2018-01-23 DIAGNOSIS — Z87.728 HISTORY OF SPINA BIFIDA: ICD-10-CM

## 2018-01-23 DIAGNOSIS — R22.0 SCALP MASS: ICD-10-CM

## 2018-01-23 DIAGNOSIS — Z98.2 VP (VENTRICULOPERITONEAL) SHUNT STATUS: ICD-10-CM

## 2018-01-23 DIAGNOSIS — G25.3 MYOCLONUS: Primary | ICD-10-CM

## 2018-01-23 DIAGNOSIS — Q03.9 CONGENITAL HYDROCEPHALUS (HCC): ICD-10-CM

## 2018-01-23 LAB — CREAT BLDA-MCNC: 0.8 MG/DL (ref 0.6–1.3)

## 2018-01-23 PROCEDURE — 0 IOPAMIDOL 61 % SOLUTION: Performed by: NEUROLOGICAL SURGERY

## 2018-01-23 PROCEDURE — 82565 ASSAY OF CREATININE: CPT

## 2018-01-23 PROCEDURE — 70492 CT SFT TSUE NCK W/O & W/DYE: CPT

## 2018-01-23 PROCEDURE — 70470 CT HEAD/BRAIN W/O & W/DYE: CPT

## 2018-01-23 PROCEDURE — 99213 OFFICE O/P EST LOW 20 MIN: CPT | Performed by: CLINICAL NURSE SPECIALIST

## 2018-01-23 RX ADMIN — IOPAMIDOL 100 ML: 612 INJECTION, SOLUTION INTRAVENOUS at 12:45

## 2018-01-23 NOTE — PROGRESS NOTES
"Subjective     Chief Complaint   Patient presents with   • Neurologic Problem         Chemo Harper is a 27 y.o. male right handed on disability. He is here today forfollow up for atypical myoclonic movements of the head. He was last seen 11/20/17. He is accompanied by his mother. He is playing on his phone the entire time during the interview and exam. At last visit mother had resumed depakote for head tremors. Per my recommendations depakote has been weaned off. Since then, mother reports maybe 2 episodes of head tremor since stopping depakote.   Did seem to be as severe or last as long. Patiet was to see psychiatrist in January 2018 but appointment had to be changed secondary to inclement weather and now has appointment March 27, 2018.   Patient also reports several days of left sided muscle spasms in his neck. He did take ibuprofen and had minimal relief. At any rate this has resolved.   Patient also has a \"lump\" left occipitally near  shunt. He did contact Dr. Germain and had CT head.        As you recall, He was accepted in transfer to [facility] 2/5/17. Details of the head tremor below. He was d/c on 2/6/17. He has been taking Depakote 250 mg every 6 hours as the 500 mg BID was causing nausea. He has returned to his baseline.         HPI Comments: On 2/5 the patient was accepted from outside facility for head tremor. He had been loaded with Depakote at an outside Medical Center with concerns of seizure.  The head tremor had resolved by the time he arrived.  Early the next morning a routine EEG was done and was unremarkable.  Not long after completion he began having stereotypic head movements again.  The tremors  would  Be describe it is fine tremors of the head and there were bilateral features of them.  They were very atypical nerve fashion and had no alteration of consciousness associated with them.  A second EEG was done in a stat fashion in order to capture these.  The head tremor was captured on a " continuous EEG and there were no signs of EEG correlation to suggest an epileptic etiology.  He was given Depakote again.  He was discharged home today on a dose of 500 mg twice a day of Depakote.  Dr. ROBERT Camacho was considering the movements  myoclonic events and could not rule out a psychogenic movement disorder.     Neurologic Problem   The patient's primary symptoms include weakness (bilateral lowe extremities). Primary symptoms comment: head tremor. This is a new problem. The current episode started more than 1 month ago (2/5/17). The problem has been resolved since onset. Pertinent negatives include no aura, bladder incontinence, bowel incontinence, chest pain, confusion, dizziness, fatigue, fever, headaches, nausea or vomiting. Treatments tried: depakote. Bleeding disorder: spina bifida, congenital hydrocephalus, s/p  shunt.        Current Outpatient Prescriptions   Medication Sig Dispense Refill   • FLUoxetine (PROzac) 10 MG capsule Take 10 mg by mouth Daily.     • oxyCODONE-acetaminophen (PERCOCET) 5-325 MG per tablet Take 1 tablet by mouth Every 6 (Six) Hours As Needed.     • QUEtiapine (SEROquel) 50 MG tablet Take 50 mg by mouth Every Night.     • raNITIdine (ZANTAC) 75 MG tablet Take 75 mg by mouth As Needed.       No current facility-administered medications for this visit.        Past Medical History:   Diagnosis Date   • Seizures    • Spina bifida        Past Surgical History:   Procedure Laterality Date   • SHUNT REVISION     •  SHUNT INSERTION         family history includes No Known Problems in his father and mother.    Social History   Substance Use Topics   • Smoking status: Never Smoker   • Smokeless tobacco: Never Used   • Alcohol use No       Review of Systems   Constitutional: Negative for fatigue and fever.   HENT: Negative for rhinorrhea, sneezing and trouble swallowing.    Eyes: Negative for visual disturbance.   Respiratory: Negative for apnea and cough.    Cardiovascular: Negative  "for chest pain.   Gastrointestinal: Negative for bowel incontinence, constipation, diarrhea, nausea and vomiting.   Genitourinary: Negative for bladder incontinence, dysuria and frequency.   Musculoskeletal: Positive for gait problem (uses arm crutches). Negative for arthralgias and myalgias.        Atrophy of LEs   Neurological: Positive for weakness (bilateral lowe extremities). Negative for dizziness, speech difficulty and headaches.   Hematological: Negative for adenopathy.   Psychiatric/Behavioral: Negative for agitation and confusion.       Objective     /80  Pulse 78  Ht 165.1 cm (65\")  Wt 59 kg (130 lb)  BMI 21.63 kg/m2, Body mass index is 21.63 kg/(m^2).    Physical Exam   Constitutional: He is oriented to person, place, and time. Vital signs are normal. He appears well-developed and well-nourished.   HENT:   Head: Normocephalic and atraumatic.   Right Ear: Hearing and external ear normal.   Left Ear: Hearing and external ear normal.   Nose: Nose normal.   Mouth/Throat: Uvula is midline, oropharynx is clear and moist and mucous membranes are normal.   Left occipital region  pecan size mass, soft, mobile. Near  shunt.   Eyes: Conjunctivae, EOM and lids are normal. Pupils are equal, round, and reactive to light.   Neck: Trachea normal, normal range of motion and full passive range of motion without pain. Neck supple. No spinous process tenderness and no muscular tenderness present. Carotid bruit is not present. No rigidity. Normal range of motion present.   Cardiovascular: Normal rate, regular rhythm, S1 normal, S2 normal and normal heart sounds.    Pulmonary/Chest: Effort normal and breath sounds normal.   Abdominal: Soft. Bowel sounds are normal.   Musculoskeletal: Normal range of motion.   Neurological: He is alert and oriented to person, place, and time. He has normal strength. He displays no tremor. No cranial nerve deficit or sensory deficit. He exhibits normal muscle tone. He displays a " negative Romberg sign. Gait abnormal. Coordination normal.   Reflex Scores:       Tricep reflexes are 2+ on the right side and 2+ on the left side.       Bicep reflexes are 2+ on the right side and 2+ on the left side.       Brachioradialis reflexes are 2+ on the right side and 2+ on the left side.       Patellar reflexes are 4+ on the right side and 4+ on the left side.       Achilles reflexes are 4+ on the right side and 4+ on the left side.  Bilateral upper extremities equal and full strength    Bilateral lower extremities 3/5 proximally and 2/5 distally.  He does wear braces on lower extremities.   He can ambulate with arm crutches but uses a wheel chair for long distances.     Skin: Skin is warm and dry.   Psychiatric: He has a normal mood and affect. His speech is normal and behavior is normal. Cognition and memory are normal.   Nursing note and vitals reviewed.           ASSESSMENT/PLAN    Diagnoses and all orders for this visit:    Myoclonus    Congenital hydrocephalus     (ventriculoperitoneal) shunt status    History of spina bifida    Scalp mass    MEDICAL DECISION MAKIN. Remain off depakote.   Patient has appointment with psychologist in 2018.  2. Patient to f/u with neurosurgery for concerns/evaluation of shunt  3. Uncertain etiology of head tremor but had previously been captured and were non epileptic. May be encephalopathic but cannot entirely rule out psychogenic tremor.  4. Seizure precautions were discussed to include no tub baths, no swimming, avoiding lack of sleep, and avoiding known triggers. Education given of things that may contribute to a seizure to include, but not limited to: stressful situations, fever, fatigue, lack of sleep, low blood sugar, hyperventilation, flashing lights, and caffeine. Instructions given to take seizure medications as prescribed. Education given to family member on what to do during a seizure and care following the seizure. Education given to contact  this office prior to stopping or changing any medications.  6. I would like to see the patient after he has seen psychologist in March 2018.      7. Patient may benefit from klonopin   8. Patient's BMI is within normal parameters. No follow-up required.     allergies and all known medications/prescriptions have been reviewed using resources available on this encounter.    Return in about 3 months (around 4/23/2018).        Anna Burleson, APRN

## 2018-02-28 ENCOUNTER — OFFICE VISIT (OUTPATIENT)
Dept: NEUROSURGERY | Facility: CLINIC | Age: 28
End: 2018-02-28

## 2018-02-28 VITALS
WEIGHT: 135 LBS | HEIGHT: 65 IN | BODY MASS INDEX: 22.49 KG/M2 | DIASTOLIC BLOOD PRESSURE: 72 MMHG | SYSTOLIC BLOOD PRESSURE: 100 MMHG

## 2018-02-28 DIAGNOSIS — Z98.2 VP (VENTRICULOPERITONEAL) SHUNT STATUS: ICD-10-CM

## 2018-02-28 DIAGNOSIS — Z78.9 NON-SMOKER: ICD-10-CM

## 2018-02-28 DIAGNOSIS — IMO0001 NORMAL BODY MASS INDEX (BMI): ICD-10-CM

## 2018-02-28 DIAGNOSIS — Q03.9 CONGENITAL HYDROCEPHALUS (HCC): Primary | ICD-10-CM

## 2018-02-28 PROCEDURE — 99213 OFFICE O/P EST LOW 20 MIN: CPT | Performed by: NURSE PRACTITIONER

## 2018-02-28 NOTE — PROGRESS NOTES
"    Chief complaint:   Chief Complaint   Patient presents with   • Congenital hydrocephalus     Chemo is followed for a shunt, he does feel a knot on his head by his shunt site, he has had an increase in headaches, Adam Burleson did send him for a recent CT scan of his head and his neck.         Subjective     HPI: This is a 28-year-old male gentleman who we follow-up for a  shunt.  He is here for yearly follow-up today.  He says that the headache pain is beginning to get worse.  The headache pain is more localized around the mass that he feels in the back of his head.  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  Is not having any seizures.  Denies any issues regarding the shunt.  His biggest complaint basis headaches.  This headache is constant.  He does take Tylenol and ibuprofen to help with the headache pain but says that he can only get minimal relief from this.  Denies any nausea vomiting.  Denies any vision changes.  He comes in today with a wheelchair but he does have crutches help get around.    Review of Systems   Musculoskeletal: Negative.    Neurological: Positive for headaches.         Objective      Vital Signs  /72 (BP Location: Right arm, Patient Position: Sitting)  Ht 165.1 cm (65\")  Wt 61.2 kg (135 lb)  BMI 22.47 kg/m2    Physical Exam   Constitutional: He is oriented to person, place, and time. He appears well-developed and well-nourished.   HENT:   Head: Normocephalic.   Eyes: EOM are normal. Pupils are equal, round, and reactive to light.   Neck: Normal range of motion.   Pulmonary/Chest: Effort normal.   Musculoskeletal: Normal range of motion.   Weakness in lower extremities.   Neurological: He is alert and oriented to person, place, and time. He has normal strength and normal reflexes. No cranial nerve deficit or sensory deficit. Gait normal. GCS eye subscore is 4. GCS verbal subscore is 5. GCS motor subscore is 6.   Skin: Skin is warm.   Psychiatric: He has a normal mood and " affect. His speech is normal and behavior is normal. Thought content normal.       Results Review: CT scan of his head shows stable ventricular size.  Radiologist did not mention that the lipoma does appear to be increased from the previous CT scan.          Assessment/Plan: At this point the patient appears to be stable from the shunt standpoint.  My concern is about the lipoma and the fact that it may be getting bigger and causing the patient's headaches.  I will discuss this patient with Dr. Germain and call them with further instructions.  BMI shows that is at a healthy weight.  He is a nonsmoker.         Chemo was seen today for congenital hydrocephalus.    Diagnoses and all orders for this visit:    Congenital hydrocephalus     (ventriculoperitoneal) shunt status    Normal body mass index (BMI)    Non-smoker      I discussed the patients findings and my recommendations with patient  John Willoughby, BRENNON  02/28/18  10:50 AM

## 2018-03-06 ENCOUNTER — TELEPHONE (OUTPATIENT)
Dept: NEUROSURGERY | Facility: CLINIC | Age: 28
End: 2018-03-06

## 2018-03-06 ENCOUNTER — DOCUMENTATION (OUTPATIENT)
Dept: NEUROSURGERY | Facility: CLINIC | Age: 28
End: 2018-03-06

## 2018-03-06 NOTE — PROGRESS NOTES
Patient needed to be seen again by Dr Germain for a lipoma on his head near his shunt.  Dr Germain said not emergency but schedule him a follow up to be seen.  We opened up an extra day to see patients and so I offered the patient's mom an appt w/Dr Germain for Wed 3/21/18 at 2:30 pm.  She said she couldn't come that day b/c she was booked up with other appts.  I gave her the next appt which was 4/19/18 at 3:30 pm and she said that was okay.    mary reich CMA

## 2018-03-06 NOTE — TELEPHONE ENCOUNTER
----- Message from BRENNON Chan sent at 3/6/2018  9:46 AM CST -----  There is no hurry on this one. He needs to be seen by Dr. Germain for evaluation of lipoma around shunt that is growing and may need to be removed. thanks

## 2018-05-08 NOTE — CONSULTS
Reason for consult   sunt evaluation    Chief Complaint   Patient presents with   • [facility]         HPI: 26-year-old male with a history of shunted hydrocephalus at birth due to spina bifida.  Patient is a poor historian.  He relates his last shunt revision was 2 years ago approximately.  He has had no trouble since.  Yesterday he says that he developed shaking of his head which has since stopped.  He says the last time he had a shunt malfunction has is what happened.  He denies any headache currently.  He says he rarely has headaches.  He is unclear what he usually has headaches when her shunt malfunctions.  He also cannot relate any history as to whether or not his CAT scan changes when his shunt malfunctions.  He describes no nausea and vomiting.  Currently he feels like his back is baseline.  He lives with his mother he is essentially nonambulatory although he has walked with crutches in the past.  He has suprapubic catheter for bladder control.    Review of Systems   All other systems reviewed and are negative.       Past Medical History:  has a past medical history of Spina bifida.    Past Surgical History:  has a past surgical history that includes Shunt revision and  Shunt Insertion.    Family History: family history is not on file.    Social History:  reports that he has never smoked. He does not have any smokeless tobacco history on file. He reports that he does not drink alcohol or use illicit drugs.    Allergies: Oxycodone and Latex    Medications: Scheduled Meds:  albuterol 2.5 mg Nebulization Q4H - RT   enoxaparin 40 mg Subcutaneous Daily   famotidine 20 mg Oral Daily   pantoprazole 40 mg Oral Q AM     Continuous Infusions:   PRN Meds:.•  acetaminophen  •  sodium chloride  •  zolpidem     Objective:  General Appearance:  Comfortable and in no acute distress.    Vital signs: (most recent): Blood pressure 98/41, pulse 107, temperature 97.6 °F (36.4 °C), temperature source Oral, resp. rate 18,  "height 60\" (152.4 cm), weight 142 lb (64.4 kg), SpO2 95 %.    Lungs:  Normal respiratory rate and normal effort.  He is not in respiratory distress.  Breath sounds clear to auscultation.    Heart: Normal rate.  Regular rhythm.  S1 normal and S2 normal.    Abdomen: Abdomen is soft and non-distended.  There is no abdominal tenderness.         Neurologic Exam     Mental Status   Oriented to person, place, and time.   Attention: normal.   Speech: speech is normal   Level of consciousness: alert  Knowledge: consistent with education.     Cranial Nerves   Cranial nerves II through XII intact.     Motor Exam   Right arm tone: normal  Left arm tone: normal  Right leg tone: increased  Left leg tone: increased       Upper extremity strength 5 out of 5 in all flexion extension movements.    Extremity strength 5 out of 5 at the psoas quadriceps and hamstrings.  He has trace movements dorsiflexion plantarflexion obvious orthopedic deformities of his feet and ankles area     Sensory Exam   Right arm light touch: normal  Left arm light touch: normal  Right leg light touch: decreased from ankle  Left leg light touch: decreased from ankle  Right arm vibration: normal  Left arm vibration: normal  Right leg vibration: decreased from ankle  Left leg vibration: decreased from ankle  Right arm proprioception: normal  Left arm proprioception: normal  Right leg proprioception: decreased from ankle  Left leg proprioception: decreased from ankle  Right leg pinprick: decreased from ankle  Left leg pinprick: decreased from ankle    Gait, Coordination, and Reflexes     Gait  Gait: (Nonambulatory)    Coordination   Finger to nose coordination: normal  Tandem walking coordination: abnormal    Tremor   Resting tremor: absent  Intention tremor: absent  Action tremor: absent      Vital Signs  Temp:  [97.6 °F (36.4 °C)-98.1 °F (36.7 °C)] 97.6 °F (36.4 °C)  Heart Rate:  [] 107  Resp:  [16-18] 18  BP: ()/(41-78) 98/41    Physical Exam "   Constitutional: He is oriented to person, place, and time.   Cardiovascular: Normal rate, regular rhythm, S1 normal and S2 normal.    Pulmonary/Chest: Effort normal. No respiratory distress.   Abdominal: Soft. He exhibits no distension.   Neurological: He is oriented to person, place, and time. He has an abnormal Tandem Gait Test. He has a normal Finger-Nose-Finger Test.   Psychiatric: His speech is normal.       Results Review:   I reviewed the patient's new clinical results.  I reviewed the patient's new imaging results and agree with the interpretation.  I reviewed the patient's other test results and agree with the interpretation          Lab Results (last 24 hours)     Procedure Component Value Units Date/Time    CBC & Differential [[id number]] Collected:  02/05/17 0834    Specimen:  Blood Updated:  02/05/17 0856    Narrative:       The following orders were created for panel order CBC & Differential.  Procedure                               Abnormality         Status                     ---------                               -----------         ------                     CBC Auto Differential[[id number]]         Abnormal            Final result                 Please view results for these tests on the individual orders.    CBC Auto Differential [[id number]]  (Abnormal) Collected:  02/05/17 0834    Specimen:  Blood from Arm, Left Updated:  02/05/17 0856     WBC 8.27 10*3/mm3      RBC 4.71 (L) 10*6/mm3      Hemoglobin 14.3 g/dL      Hematocrit 40.9 %      MCV 86.8 fL      MCH 30.4 pg      MCHC 35.0 g/dL      RDW 13.0 %      RDW-SD 41.5 fl      MPV 9.7 fL      Platelets 239 10*3/mm3      Neutrophil % 65.7 %      Lymphocyte % 25.0 %      Monocyte % 7.9 %      Eosinophil % 1.0 %      Basophil % 0.2 %      Immature Grans % 0.2 %      Neutrophils, Absolute 5.43 10*3/mm3      Lymphocytes, Absolute 2.07 10*3/mm3      Monocytes, Absolute 0.65 10*3/mm3      Eosinophils, Absolute 0.08 10*3/mm3      Basophils, Absolute 0.02  10*3/mm3      Immature Grans, Absolute 0.02 10*3/mm3     Comprehensive Metabolic Panel [[id number]] Collected:  02/05/17 0833    Specimen:  Blood from Arm, Left Updated:  02/05/17 0857     Glucose 95 mg/dL      BUN 13 mg/dL      Creatinine 0.67 mg/dL      Sodium 141 mmol/L      Potassium 3.9 mmol/L      Chloride 104 mmol/L      CO2 27.0 mmol/L      Calcium 9.0 mg/dL      Total Protein 7.0 g/dL      Albumin 4.20 g/dL      ALT (SGPT) 41 U/L      AST (SGOT) 21 U/L      Alkaline Phosphatase 41 U/L      Total Bilirubin 0.5 mg/dL      eGFR Non African Amer 146 mL/min/1.73      Globulin 2.8 gm/dL      A/G Ratio 1.5 g/dL      BUN/Creatinine Ratio 19.4      Anion Gap 10.0 mmol/L     CBC & Differential [[id number]] Collected:  02/05/17 1219    Specimen:  Blood Updated:  02/05/17 1227    Narrative:       The following orders were created for panel order CBC & Differential.  Procedure                               Abnormality         Status                     ---------                               -----------         ------                     CBC Auto Differential[[id number]]         Abnormal            Final result                 Please view results for these tests on the individual orders.    CBC Auto Differential [[id number]]  (Abnormal) Collected:  02/05/17 1219    Specimen:  Blood Updated:  02/05/17 1227     WBC 7.51 10*3/mm3      RBC 4.63 (L) 10*6/mm3      Hemoglobin 14.1 g/dL      Hematocrit 40.3 %      MCV 87.0 fL      MCH 30.5 pg      MCHC 35.0 g/dL      RDW 13.0 %      RDW-SD 41.4 fl      MPV 9.5 fL      Platelets 232 10*3/mm3      Neutrophil % 67.1 %      Lymphocyte % 23.7 %      Monocyte % 7.5 %      Eosinophil % 1.1 %      Basophil % 0.3 %      Immature Grans % 0.3 %      Neutrophils, Absolute 5.05 10*3/mm3      Lymphocytes, Absolute 1.78 10*3/mm3      Monocytes, Absolute 0.56 10*3/mm3      Eosinophils, Absolute 0.08 10*3/mm3      Basophils, Absolute 0.02 10*3/mm3      Immature Grans, Absolute 0.02 10*3/mm3     Basic  Metabolic Panel [[id number]]  (Normal) Collected:  02/05/17 1219    Specimen:  Blood Updated:  02/05/17 1238     Glucose 93 mg/dL      BUN 14 mg/dL      Creatinine 0.69 mg/dL      Sodium 140 mmol/L      Potassium 4.3 mmol/L      Chloride 106 mmol/L      CO2 25.0 mmol/L      Calcium 9.0 mg/dL      eGFR Non African Amer 141 mL/min/1.73      BUN/Creatinine Ratio 20.3      Anion Gap 9.0 mmol/L     Narrative:       GFR Normal >60  Chronic Kidney Disease <60  Kidney Failure <15    CBC Auto Differential [[id number]]  (Abnormal) Collected:  02/06/17 0515    Specimen:  Blood Updated:  02/06/17 0551     WBC 6.13 10*3/mm3      RBC 4.50 (L) 10*6/mm3      Hemoglobin 13.4 (L) g/dL      Hematocrit 39.6 (L) %      MCV 88.0 fL      MCH 29.8 pg      MCHC 33.8 g/dL      RDW 13.2 %      RDW-SD 42.2 fl      MPV 9.9 fL      Platelets 211 10*3/mm3      Neutrophil % 47.8 %      Lymphocyte % 41.4 %      Monocyte % 5.7 %      Eosinophil % 4.4 (H) %      Basophil % 0.5 %      Immature Grans % 0.2 %      Neutrophils, Absolute 2.93 10*3/mm3      Lymphocytes, Absolute 2.54 10*3/mm3      Monocytes, Absolute 0.35 10*3/mm3      Eosinophils, Absolute 0.27 10*3/mm3      Basophils, Absolute 0.03 10*3/mm3      Immature Grans, Absolute 0.01 10*3/mm3     CBC & Differential [[id number]] Collected:  02/06/17 0515    Specimen:  Blood Updated:  02/06/17 0551    Narrative:       The following orders were created for panel order CBC & Differential.  Procedure                               Abnormality         Status                     ---------                               -----------         ------                     CBC Auto Differential[[id number]]         Abnormal            Final result                 Please view results for these tests on the individual orders.    Basic Metabolic Panel [[id number]]  (Abnormal) Collected:  02/06/17 0515    Specimen:  Blood Updated:  02/06/17 0601     Glucose 103 (H) mg/dL      BUN 13 mg/dL      Creatinine 0.71 mg/dL      Sodium  139 mmol/L      Potassium 3.4 (L) mmol/L      Chloride 104 mmol/L      CO2 25.0 mmol/L      Calcium 8.9 mg/dL      eGFR Non African Amer 136 mL/min/1.73      BUN/Creatinine Ratio 18.3      Anion Gap 10.0 mmol/L     Narrative:       GFR Normal >60  Chronic Kidney Disease <60  Kidney Failure <15        Active Problems:    Myoclonus     (ventriculoperitoneal) shunt status      Assessment/Plan:   CT scan of the brain shows slit ventricles with no obvious signs of hydrocephalus or shunt malfunction, shunt series shows no obvious discontinuity of the shunt tubing although calcification around the shunt makes it difficult to completely interpret.  Clinically very unlikely Kun is having a shunt malfunction currently.  I am happy to follow him as an outpatient as part of her normal shunt observation protocol.    I discussed the patients findings and my recommendations with patient and family    Red Germain MD  02/06/17  7:39 AM    Time: More than 50% of time spent in counseling and coordination of care:  Total face-to-face/floor time 60 min.  Time spent in counseling 20 min. Counseling included the following topics: Diagnosis, treatment, condition, and plan       Attending Attestation (For Attendings USE Only)...